# Patient Record
Sex: MALE | Race: WHITE | NOT HISPANIC OR LATINO | Employment: FULL TIME | URBAN - METROPOLITAN AREA
[De-identification: names, ages, dates, MRNs, and addresses within clinical notes are randomized per-mention and may not be internally consistent; named-entity substitution may affect disease eponyms.]

---

## 2023-09-01 LAB
CHOLESTEROL (MG/DL) IN SER/PLAS: 144 MG/DL (ref 0–199)
CHOLESTEROL IN HDL (MG/DL) IN SER/PLAS: 52.2 MG/DL
CHOLESTEROL/HDL RATIO: 2.8
FASTING GLUCOSE (MG/DL) IN SER/PLAS: 148 MG/DL (ref 74–99)
LDL: 72 MG/DL (ref 0–99)
THYROTROPIN (MIU/L) IN SER/PLAS BY DETECTION LIMIT <= 0.05 MIU/L: 1.8 MIU/L (ref 0.44–3.98)
TRIGLYCERIDE (MG/DL) IN SER/PLAS: 101 MG/DL (ref 0–149)
VLDL: 20 MG/DL (ref 0–40)

## 2023-09-02 LAB
CHOLESTEROL IN LDL (MG/DL) IN SER/PLAS BY DIRECT ASSAY: 75 MG/DL (ref 0–129)
THYROXINE (T4) (UG/DL) IN SER/PLAS: 7.5 UG/DL (ref 4.5–11.1)

## 2023-12-07 DIAGNOSIS — I25.10 CORONARY ARTERY DISEASE INVOLVING NATIVE CORONARY ARTERY OF NATIVE HEART, UNSPECIFIED WHETHER ANGINA PRESENT: ICD-10-CM

## 2023-12-07 DIAGNOSIS — N52.9 ERECTILE DYSFUNCTION, UNSPECIFIED ERECTILE DYSFUNCTION TYPE: Primary | ICD-10-CM

## 2023-12-07 NOTE — TELEPHONE ENCOUNTER
----- Message from Malini Palacio sent at 12/7/2023 11:24 AM EST -----  Regarding: Med Refill  Patient called for refill of sildenafil citrate 100mg as needed.  Please send to [facility] .

## 2023-12-08 RX ORDER — SILDENAFIL 100 MG/1
100 TABLET, FILM COATED ORAL AS NEEDED
COMMUNITY
Start: 2022-03-10 | End: 2023-12-08 | Stop reason: SDUPTHER

## 2023-12-08 RX ORDER — SILDENAFIL 100 MG/1
100 TABLET, FILM COATED ORAL AS NEEDED
Qty: 20 TABLET | Refills: 0 | Status: SHIPPED | OUTPATIENT
Start: 2023-12-08 | End: 2024-02-21 | Stop reason: SDUPTHER

## 2023-12-08 NOTE — TELEPHONE ENCOUNTER
Last appointment: 8/24/23 with Dr. Bolaños  Next appointment: Needs 6 month appointment scheduled. Message sent to Edyta  Hasn't seen APRN

## 2023-12-11 NOTE — TELEPHONE ENCOUNTER
Order placed and I notified patient. He would like to have towards end of January. Message sent to Edyta.

## 2023-12-11 NOTE — TELEPHONE ENCOUNTER
I called and spoke with patient and let him know medication refilled, but future refills will need to come from PCP or we could refer to urology, but he declined at this time.  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 He is wondering if stress test could be ordered prior to his upcoming appointment 2/9/24 with Dr. Bolaños? Which type of stress test if ordering. Thanks

## 2023-12-28 ENCOUNTER — APPOINTMENT (OUTPATIENT)
Dept: CARDIOLOGY | Facility: HOSPITAL | Age: 73
End: 2023-12-28
Payer: COMMERCIAL

## 2024-01-29 ENCOUNTER — TELEPHONE (OUTPATIENT)
Dept: CARDIOLOGY | Facility: CLINIC | Age: 74
End: 2024-01-29

## 2024-01-29 ENCOUNTER — HOSPITAL ENCOUNTER (OUTPATIENT)
Dept: CARDIOLOGY | Facility: HOSPITAL | Age: 74
Discharge: HOME | End: 2024-01-29
Payer: COMMERCIAL

## 2024-01-29 DIAGNOSIS — I25.10 CORONARY ARTERY DISEASE INVOLVING NATIVE CORONARY ARTERY OF NATIVE HEART, UNSPECIFIED WHETHER ANGINA PRESENT: ICD-10-CM

## 2024-01-29 PROCEDURE — 93018 CV STRESS TEST I&R ONLY: CPT | Performed by: INTERNAL MEDICINE

## 2024-01-29 PROCEDURE — 93016 CV STRESS TEST SUPVJ ONLY: CPT | Performed by: INTERNAL MEDICINE

## 2024-01-29 PROCEDURE — 93017 CV STRESS TEST TRACING ONLY: CPT

## 2024-01-29 NOTE — TELEPHONE ENCOUNTER
----- Message from Alber Bolaños MD sent at 1/29/2024  7:23 AM EST -----  Regarding: RE: Stress Results  It was already read by Dr. De La Rosa. Unfortunately he read it abnormal. He will need a repeat stress with echo as ECG may be false positive  ----- Message -----  From: Edyta Howell  Sent: 1/29/2024   7:00 AM EST  To: Alber Bolaños MD  Subject: Stress Results                                   Patient would like you to be the one to read his stress test results. He had it today at 8 am per [facility].

## 2024-01-30 NOTE — TELEPHONE ENCOUNTER
----- Message from Alber Bolaños MD sent at 1/30/2024 12:34 PM EST -----  Stress test looks ok on this one

## 2024-01-30 NOTE — TELEPHONE ENCOUNTER
"Just to clarify the 2 different messages. Please review. Does patient need \"repeat stress with echo as ECG may be false positive \" since Dr. De La Rosa read as abnormal?  "

## 2024-02-07 DIAGNOSIS — Z00.00 BLOOD TESTS FOR ROUTINE GENERAL PHYSICAL EXAMINATION: Primary | ICD-10-CM

## 2024-02-07 RX ORDER — METOPROLOL SUCCINATE 100 MG/1
100 TABLET, EXTENDED RELEASE ORAL DAILY
COMMUNITY
End: 2024-02-09 | Stop reason: SDUPTHER

## 2024-02-07 RX ORDER — EPINEPHRINE 0.22MG
100 AEROSOL WITH ADAPTER (ML) INHALATION
COMMUNITY
Start: 2020-03-30

## 2024-02-07 RX ORDER — TADALAFIL 5 MG/1
5 TABLET ORAL
COMMUNITY
Start: 2022-03-10 | End: 2024-02-09 | Stop reason: WASHOUT

## 2024-02-07 RX ORDER — DILTIAZEM HYDROCHLORIDE 180 MG/1
CAPSULE, EXTENDED RELEASE ORAL
COMMUNITY
End: 2024-02-09 | Stop reason: WASHOUT

## 2024-02-07 RX ORDER — FUROSEMIDE 20 MG/1
1 TABLET ORAL DAILY
COMMUNITY
End: 2024-02-09 | Stop reason: WASHOUT

## 2024-02-07 RX ORDER — CHOLECALCIFEROL (VITAMIN D3) 125 MCG
CAPSULE ORAL
COMMUNITY

## 2024-02-07 RX ORDER — LISINOPRIL 2.5 MG/1
1 TABLET ORAL DAILY
COMMUNITY
Start: 2018-07-18 | End: 2024-02-09 | Stop reason: SDUPTHER

## 2024-02-07 RX ORDER — FLUTICASONE PROPIONATE 50 MCG
SPRAY, SUSPENSION (ML) NASAL
COMMUNITY
Start: 2023-03-07

## 2024-02-07 RX ORDER — AMIODARONE HYDROCHLORIDE 200 MG/1
200 TABLET ORAL 2 TIMES DAILY
COMMUNITY
End: 2024-02-09 | Stop reason: WASHOUT

## 2024-02-07 RX ORDER — NITROGLYCERIN 0.4 MG/1
0.4 TABLET SUBLINGUAL
COMMUNITY

## 2024-02-07 RX ORDER — ATORVASTATIN CALCIUM 40 MG/1
1 TABLET, FILM COATED ORAL DAILY
COMMUNITY
Start: 2018-01-16 | End: 2024-02-09 | Stop reason: SDUPTHER

## 2024-02-07 RX ORDER — ASPIRIN 81 MG/1
1 TABLET ORAL DAILY
COMMUNITY
Start: 2019-06-19

## 2024-02-08 ENCOUNTER — LAB (OUTPATIENT)
Dept: LAB | Facility: LAB | Age: 74
End: 2024-02-08
Payer: MEDICARE

## 2024-02-08 DIAGNOSIS — Z00.00 BLOOD TESTS FOR ROUTINE GENERAL PHYSICAL EXAMINATION: ICD-10-CM

## 2024-02-08 LAB
CHOLEST SERPL-MCNC: 135 MG/DL (ref 0–199)
GLUCOSE P FAST SERPL-MCNC: 123 MG/DL (ref 74–99)
HDLC SERPL-MCNC: 51 MG/DL
LDLC SERPL DIRECT ASSAY-MCNC: 73 MG/DL (ref 0–129)
TRIGL SERPL-MCNC: 81 MG/DL (ref 0–149)
TSH SERPL-ACNC: 0.53 MIU/L (ref 0.44–3.98)

## 2024-02-08 PROCEDURE — 84443 ASSAY THYROID STIM HORMONE: CPT

## 2024-02-08 PROCEDURE — 36415 COLL VENOUS BLD VENIPUNCTURE: CPT

## 2024-02-08 PROCEDURE — 82947 ASSAY GLUCOSE BLOOD QUANT: CPT

## 2024-02-08 PROCEDURE — 80061 LIPID PANEL: CPT

## 2024-02-08 PROCEDURE — 83721 ASSAY OF BLOOD LIPOPROTEIN: CPT

## 2024-02-08 NOTE — PROGRESS NOTES
"Counseling:  The patient was counseled regarding diagnostic results, instructions for management, risk factor reductions, prognosis, patient and family education, impressions, risks and benefits of treatment options and importance of compliance with treatment.      Chief Complaint:   The patient presents today for 6-month followup of a-fib/flutter, CMP, and CAD.      History Of Present Illness:    Alfred Díaz is a 73 year old male patient who presents today for 6-month followup of a-fib/flutter, CMP, and CAD. His PMH is significant for CAD s/p PCI of RCA 08/22/2016 with repeat in 12/2021, HTN, BPH, hyperlipidemia, h/p PE, atrial flutter/a-fib with COVID pneumonia, CMP, and h/o non-Hodgkin's lymphoma 20 years ago (treated, in remission). Stress testing performed 01/29/2024 was negative for ischemia. Over the past 6 months, the patient states that he has done well from a cardiac standpoint. He denies any CP, chest discomfort or SOB. BP is stable. The patient is compliant with his prescribed medications.     Last Recorded Vitals:  Vitals:    02/09/24 1102 02/09/24 1116   BP: (!) 158/100 134/70   BP Location: Left arm    Pulse: 80    SpO2: 97%    Weight: 117 kg (259 lb)    Height: 1.88 m (6' 2\")        Past Surgical History:  He has a past surgical history that includes Tonsillectomy (06/06/2018); Adenoidectomy (06/06/2018); Lymph node biopsy (06/06/2018); Other surgical history (04/06/2020); Coronary angioplasty (06/07/2018); and Ankle surgery (06/07/2018).      Social History:  He reports that he has never smoked. He has never used smokeless tobacco. He reports that he does not currently use alcohol. He reports that he does not use drugs.    Family History:  No family history on file.     Allergies:  Patient has no known allergies.    Outpatient Medications:  Current Outpatient Medications   Medication Instructions    aspirin 81 mg EC tablet 1 tablet, oral, Daily    atorvastatin (Lipitor) 40 mg tablet 1 tablet, oral, " Daily    cholecalciferol (Vitamin D-3) 125 MCG (5000 UT) capsule oral    coenzyme Q-10 100 mg, oral    fluticasone (Flonase) 50 mcg/actuation nasal spray INSTILL 2 SPRAYS IN EACH NOSTRIL EVERY DAY 1 HOUR BEFORE BEDTIME    lisinopril 2.5 mg tablet 1 tablet, oral, Daily    metoprolol succinate XL (TOPROL-XL) 100 mg, oral, Daily    nitroglycerin (NITROSTAT) 0.4 mg, sublingual    sildenafil (VIAGRA) 100 mg, oral, As needed, DAILY 1 HOUR BEFORE NEEDED     Review of Systems   All other systems reviewed and are negative.     Physical Exam:  Constitutional:       Appearance: Healthy appearance. Not in distress.   Neck:      Vascular: No JVR. JVD normal.   Pulmonary:      Effort: Pulmonary effort is normal.      Breath sounds: Normal breath sounds. No wheezing. No rhonchi. No rales.   Chest:      Chest wall: Not tender to palpatation.   Cardiovascular:      PMI at left midclavicular line. Normal rate. Regular rhythm. Normal S1. Normal S2.       Murmurs: There is no murmur.      No gallop.  No click. No rub.   Pulses:     Intact distal pulses.   Edema:     Peripheral edema absent.   Abdominal:      General: Bowel sounds are normal.      Palpations: Abdomen is soft.      Tenderness: There is no abdominal tenderness.   Musculoskeletal: Normal range of motion.         General: No tenderness. Skin:     General: Skin is warm and dry.   Neurological:      General: No focal deficit present.      Mental Status: Alert and oriented to person, place and time.          Last Labs:  CBC -  Lab Results   Component Value Date    WBC 4.3 (L) 02/06/2023    HGB 14.2 02/06/2023    HCT 40.9 (L) 02/06/2023    MCV 86 02/06/2023     02/06/2023       CMP -  Lab Results   Component Value Date    CALCIUM 9.1 02/06/2023    PROT 6.5 04/27/2022    ALBUMIN 4.3 04/27/2022    AST 12 04/27/2022    ALT 11 04/27/2022    ALKPHOS 76 04/27/2022    BILITOT 0.9 04/27/2022       LIPID PANEL -   Lab Results   Component Value Date    CHOL 135 02/08/2024    TRIG  81 02/08/2024    HDL 51.0 02/08/2024    CHHDL 2.8 09/01/2023    LDLF 72 09/01/2023    VLDL 20 09/01/2023       RENAL FUNCTION PANEL -   Lab Results   Component Value Date    GLUCOSE 146 (H) 02/06/2023     02/06/2023    K 3.7 02/06/2023     02/06/2023    CO2 27 02/06/2023    ANIONGAP 11 02/06/2023    BUN 16 02/06/2023    CREATININE 0.78 02/06/2023    GFRMALE >90 02/06/2023    CALCIUM 9.1 02/06/2023    ALBUMIN 4.3 04/27/2022        Lab Results   Component Value Date     (H) 11/06/2021       Last Cardiology Tests:  01/29/2024 - Exercise Stress Test  1. Adequate level of stress achieved.  2. No clinical or electrocardiographic evidence for ischemia at maximal workload.  3. No ECG changes from baseline.    02/07/2023 - Cardiac Catheterization (LH)  1. Non obstructive CAD in a right dominant system. Previous patent stent in the RCA.  2. Left Ventricular end-diastolic pressure = 16 mmHg.     01/31/2023 - Exercise Stress Test  1. Abnormal Stress Test.  2. The adequate level of stress was achieved.  3. The [place] score is 4.  4. ECG changes consistent with ischemia.      04/12/2022 to 04/14/2022 - Holter Monitor  1. Primary rhythm was sinus rhythm; avg HR 64 bpm, min HR 44 bpm, max  bpm.  2. PSVC(s): [place] was 0.15%.  3. SVT (AT, RT): 7 events; longest event 8 beats, fastest event 138 bpm.  4. PVC(s): [place] was 0.17%.     03/03/2022 - TTE  1. The left ventricular systolic function is normal with a 60-65% estimated ejection fraction.  2. Spectral Doppler shows an impaired relaxation pattern of left ventricular diastolic filling.  3. Severely enlarged right ventricle.  4. The left atrium is moderately dilated.     03/03/2022 - CTA Chest for PE  1. Limited evaluation for pulmonary embolism. No definite large or obvious pulmonary embolism is seen. However a small pulmonary embolism not excluded given suboptimal contrast phase.  2. Tiny area of atelectasis or infiltrate in the right lung base. Consider  short-term follow-up to document resolution.  3. Coronary artery calcification.     01/21/2022 - Exercise Stress Test  1. Non-diagnostic due to abnormal baseline EKG. Hypertensive blood pressure response.  2. The adequate level of stress was achieved.     12/16/2021 - Cardiac Catheterization (LH)  1. Single vessel coronary artery disease without proximal left anterior descending involvement.  2. The right posterior descending artery showed severe atherosclerotic disease.  3. Culprit vessel(s): right posterior descending artery.  4. Successful PCI of PDA.     12/15/2021 - TTE  1. The left ventricular systolic function is mildly decreased with a 40-45% estimated ejection fraction.  2. There is global hypokinesis of the left ventricle with minor regional variations.     12/14/2021 - Electrophysiology Procedure  1. 5MG IV METOPROLOL TARTRATE GIVEN IVP PRIOR TO CARDIOVERSION FOR RATE CONTROL.  2. Successful direct current cardioversion for atrial fibrillation, resulting in normal sinus rhythm.     11/23/2021 - [facility]  1. The left ventricular systolic function is moderately decreased with a 30-35% estimated ejection fraction.  2. Successful direct current cardioversion for atrial fibrillation resulting in normal sinus rhythm.  3. The left atrium is severely dilated.  4. No left atrial thrombus.  5. There is global hypokinesis of the left ventricle with minor regional variations.     11/06/2021 - CXR  Airspace disease right lung base. Suspect early fluid overload.     11/05/2021 - CTA Chest for PE  1. Single subsegmental pulmonary embolism right lower lobe and single segmental pulmonary embolism left upper lobe extending into proximal segmental branches. No evidence of right heart strain or pulmonary infarction.  2. Severe pulmonary edema and small bilateral pleural effusions.  3. Peripancreatic stranding/edema, suboptimally assessed due to contrast timing and partial visualization and could be due to save anasarca the  recommend correlation with pancreatic enzymes.     11/05/2021 - CXR  1. Bilateral symmetric interstitial opacities representing interlobular septal thickening with perihilar congestion suggestive of interstitial edema.  2. Ill-defined bibasilar airspace opacities may relate to alveolar edema or pneumonia given known positive COVID-19 status.     11/05/2021 - U/S Duplex Lower Extremity Veins, Bilateral  1. No evidence for DVT within the right lower extremity.  2. Two right Shelby's cysts.     11/05/2021 - TTE  1. The left ventricular systolic function is moderately decreased with a 35-40% estimated ejection fraction.  2. There is global hypokinesis of the left ventricle with minor regional variations.     07/30/2020 - Exercise Stress Test  1. Probably normal graded exercise stress test.  2. Interpretation limited by motion artifact.  3. If clinically indicated, would consider stress testing via alternate modality, such as Lexiscan or dobutamine stress testing.  4. No clinical evidence for ischemia at maximal workload.  5. The adequate level of stress was achieved.     07/05/2018 - Echocardiogram   1. Normal left ventricular size and regional systolic function with an ejection fraction of 55%.  2. Moderate concentric left ventricular hypertrophy.  3. Normal right ventricular systolic function.      08/22/2016 - Cardiac Catheterization   1. LVEDP elevated at 24 mmHg.  2. Left Ventricle: The estimated ejection fraction is 40%. Hypokinesis of the diaphragmatic myocardium.  3. Right dominant coronary system.   4. Right Coronary: Mid-vessel lesion. There is a 99% stenosis. The lesion was stented. Following intervention, the lesion has a residual stenosis of 0%, an excellent angiographic appearance, and NARCISA grade 3 flow (brisk flow).  5. Left to right collaterals.    Lab review: I have personally reviewed the laboratory result(s).   Diagnostic review: I have personally reviewed the result(s) of the Exercise Stress  Echo.    Assessment/Plan   1) Atrial Flutter/Fib with COVID pneumonia  metoprolol succinate 100 mg daily   s/p Cardioversion  In NSR  Holter for 24 hrs - In NSR  Off anticoagulation  I suspect a-fib was precipitated by COVID     2) Cardiomyopathy  On ASA 81 mg daily, lisinopril 2.5 mg daily, metoprolol succinate 100 mg daily   Possibly rate related due to a-fib  EF normalized  Stable and asymptomatic   F/U 6 months     3) Hx of PE  On anticoagulation  Repeat CT chest with resolution of thrombus     4) CAD s/p PCI of PDA  On ASA 81 mg daily, lisinopril 2.5 mg daily, metoprolol succinate 100 mg daily, atorvastatin 40 mg daily  Most recent cath with patent stent and no new CAD  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

## 2024-02-09 ENCOUNTER — OFFICE VISIT (OUTPATIENT)
Dept: CARDIOLOGY | Facility: CLINIC | Age: 74
End: 2024-02-09
Payer: COMMERCIAL

## 2024-02-09 VITALS
WEIGHT: 259 LBS | OXYGEN SATURATION: 97 % | DIASTOLIC BLOOD PRESSURE: 70 MMHG | BODY MASS INDEX: 33.24 KG/M2 | HEIGHT: 74 IN | SYSTOLIC BLOOD PRESSURE: 134 MMHG | HEART RATE: 80 BPM

## 2024-02-09 DIAGNOSIS — I25.10 CORONARY ARTERY DISEASE INVOLVING NATIVE CORONARY ARTERY OF NATIVE HEART, UNSPECIFIED WHETHER ANGINA PRESENT: Primary | ICD-10-CM

## 2024-02-09 PROCEDURE — 99213 OFFICE O/P EST LOW 20 MIN: CPT | Performed by: INTERNAL MEDICINE

## 2024-02-09 PROCEDURE — 1159F MED LIST DOCD IN RCRD: CPT | Performed by: INTERNAL MEDICINE

## 2024-02-09 PROCEDURE — 3008F BODY MASS INDEX DOCD: CPT | Performed by: INTERNAL MEDICINE

## 2024-02-09 PROCEDURE — 1160F RVW MEDS BY RX/DR IN RCRD: CPT | Performed by: INTERNAL MEDICINE

## 2024-02-09 PROCEDURE — 1036F TOBACCO NON-USER: CPT | Performed by: INTERNAL MEDICINE

## 2024-02-09 RX ORDER — LISINOPRIL 2.5 MG/1
2.5 TABLET ORAL DAILY
Qty: 90 TABLET | Refills: 3 | Status: SHIPPED | OUTPATIENT
Start: 2024-02-09 | End: 2025-02-08

## 2024-02-09 RX ORDER — METOPROLOL SUCCINATE 100 MG/1
100 TABLET, EXTENDED RELEASE ORAL DAILY
Qty: 90 TABLET | Refills: 3 | Status: SHIPPED | OUTPATIENT
Start: 2024-02-09 | End: 2025-02-08

## 2024-02-09 RX ORDER — ATORVASTATIN CALCIUM 40 MG/1
40 TABLET, FILM COATED ORAL DAILY
Qty: 90 TABLET | Refills: 3 | Status: SHIPPED | OUTPATIENT
Start: 2024-02-09 | End: 2025-02-08

## 2024-02-09 ASSESSMENT — ENCOUNTER SYMPTOMS
OCCASIONAL FEELINGS OF UNSTEADINESS: 0
LOSS OF SENSATION IN FEET: 0
DEPRESSION: 0

## 2024-02-09 NOTE — Clinical Note
"February 9, 2024       No Recipients    Patient: Alfred Díaz   YOB: 1950   Date of Visit: 2/9/2024       Dear Dr. Pendleton Recipients:    Thank you for referring Alfred Díaz to me for evaluation. Below are my notes for this consultation.  If you have questions, please do not hesitate to call me. I look forward to following your patient along with you.       Sincerely,     Alber Bolaños MD      CC:   No Recipients  ______________________________________________________________________________________    Counseling:  The patient was counseled regarding diagnostic results, instructions for management, risk factor reductions, prognosis, patient and family education, impressions, risks and benefits of treatment options and importance of compliance with treatment.      Chief Complaint:   The patient presents today for 6-month followup of a-fib/flutter, CMP, and CAD.      History Of Present Illness:    Alfred Díaz is a 73 year old male patient who presents today for 6-month followup of a-fib/flutter, CMP, and CAD. His PMH is significant for CAD s/p PCI of RCA 08/22/2016 with repeat in 12/2021, HTN, BPH, hyperlipidemia, h/p PE, atrial flutter/a-fib with COVID pneumonia, CMP, and h/o non-Hodgkin's lymphoma 20 years ago (treated, in remission). Stress testing performed 01/29/2024 was negative for ischemia. Over the past 6 months, the patient states that he has done well from a cardiac standpoint. He denies any CP, chest discomfort or SOB.      Last Recorded Vitals:  Vitals:    02/09/24 1102 02/09/24 1116   BP: (!) 158/100 134/70   BP Location: Left arm    Pulse: 80    SpO2: 97%    Weight: 117 kg (259 lb)    Height: 1.88 m (6' 2\")        Past Surgical History:  He has a past surgical history that includes Tonsillectomy (06/06/2018); Adenoidectomy (06/06/2018); Lymph node biopsy (06/06/2018); Other surgical history (04/06/2020); Coronary angioplasty (06/07/2018); and Ankle surgery (06/07/2018).      Social " History:  He reports that he has never smoked. He has never used smokeless tobacco. He reports that he does not currently use alcohol. He reports that he does not use drugs.    Family History:  No family history on file.     Allergies:  Patient has no known allergies.    Outpatient Medications:  Current Outpatient Medications   Medication Instructions    aspirin 81 mg EC tablet 1 tablet, oral, Daily    atorvastatin (Lipitor) 40 mg tablet 1 tablet, oral, Daily    cholecalciferol (Vitamin D-3) 125 MCG (5000 UT) capsule oral    coenzyme Q-10 100 mg, oral    fluticasone (Flonase) 50 mcg/actuation nasal spray INSTILL 2 SPRAYS IN EACH NOSTRIL EVERY DAY 1 HOUR BEFORE BEDTIME    lisinopril 2.5 mg tablet 1 tablet, oral, Daily    metoprolol succinate XL (TOPROL-XL) 100 mg, oral, Daily    nitroglycerin (NITROSTAT) 0.4 mg, sublingual    sildenafil (VIAGRA) 100 mg, oral, As needed, DAILY 1 HOUR BEFORE NEEDED     Review of Systems   All other systems reviewed and are negative.     Physical Exam:  Constitutional:       Appearance: Healthy appearance. Not in distress.   Neck:      Vascular: No JVR. JVD normal.   Pulmonary:      Effort: Pulmonary effort is normal.      Breath sounds: Normal breath sounds. No wheezing. No rhonchi. No rales.   Chest:      Chest wall: Not tender to palpatation.   Cardiovascular:      PMI at left midclavicular line. Normal rate. Regular rhythm. Normal S1. Normal S2.       Murmurs: There is no murmur.      No gallop.  No click. No rub.   Pulses:     Intact distal pulses.   Edema:     Peripheral edema absent.   Abdominal:      General: Bowel sounds are normal.      Palpations: Abdomen is soft.      Tenderness: There is no abdominal tenderness.   Musculoskeletal: Normal range of motion.         General: No tenderness. Skin:     General: Skin is warm and dry.   Neurological:      General: No focal deficit present.      Mental Status: Alert and oriented to person, place and time.          Last Labs:  CBC  -  Lab Results   Component Value Date    WBC 4.3 (L) 02/06/2023    HGB 14.2 02/06/2023    HCT 40.9 (L) 02/06/2023    MCV 86 02/06/2023     02/06/2023       CMP -  Lab Results   Component Value Date    CALCIUM 9.1 02/06/2023    PROT 6.5 04/27/2022    ALBUMIN 4.3 04/27/2022    AST 12 04/27/2022    ALT 11 04/27/2022    ALKPHOS 76 04/27/2022    BILITOT 0.9 04/27/2022       LIPID PANEL -   Lab Results   Component Value Date    CHOL 135 02/08/2024    TRIG 81 02/08/2024    HDL 51.0 02/08/2024    CHHDL 2.8 09/01/2023    LDLF 72 09/01/2023    VLDL 20 09/01/2023       RENAL FUNCTION PANEL -   Lab Results   Component Value Date    GLUCOSE 146 (H) 02/06/2023     02/06/2023    K 3.7 02/06/2023     02/06/2023    CO2 27 02/06/2023    ANIONGAP 11 02/06/2023    BUN 16 02/06/2023    CREATININE 0.78 02/06/2023    GFRMALE >90 02/06/2023    CALCIUM 9.1 02/06/2023    ALBUMIN 4.3 04/27/2022        Lab Results   Component Value Date     (H) 11/06/2021       Last Cardiology Tests:  01/29/2024 - Exercise Stress Test  1. Adequate level of stress achieved.  2. No clinical or electrocardiographic evidence for ischemia at maximal workload.  3. No ECG changes from baseline.    02/07/2023 - Cardiac Catheterization (LH)  1. Non obstructive CAD in a right dominant system. Previous patent stent in the RCA.  2. Left Ventricular end-diastolic pressure = 16 mmHg.     01/31/2023 - Exercise Stress Test  1. Abnormal Stress Test.  2. The adequate level of stress was achieved.  3. The [place] score is 4.  4. ECG changes consistent with ischemia.      04/12/2022 to 04/14/2022 - Holter Monitor  1. Primary rhythm was sinus rhythm; avg HR 64 bpm, min HR 44 bpm, max  bpm.  2. PSVC(s): [place] was 0.15%.  3. SVT (AT, RT): 7 events; longest event 8 beats, fastest event 138 bpm.  4. PVC(s): [place] was 0.17%.     03/03/2022 - TTE  1. The left ventricular systolic function is normal with a 60-65% estimated ejection fraction.  2. Spectral  Doppler shows an impaired relaxation pattern of left ventricular diastolic filling.  3. Severely enlarged right ventricle.  4. The left atrium is moderately dilated.     03/03/2022 - CTA Chest for PE  1. Limited evaluation for pulmonary embolism. No definite large or obvious pulmonary embolism is seen. However a small pulmonary embolism not excluded given suboptimal contrast phase.  2. Tiny area of atelectasis or infiltrate in the right lung base. Consider short-term follow-up to document resolution.  3. Coronary artery calcification.     01/21/2022 - Exercise Stress Test  1. Non-diagnostic due to abnormal baseline EKG. Hypertensive blood pressure response.  2. The adequate level of stress was achieved.     12/16/2021 - Cardiac Catheterization (LH)  1. Single vessel coronary artery disease without proximal left anterior descending involvement.  2. The right posterior descending artery showed severe atherosclerotic disease.  3. Culprit vessel(s): right posterior descending artery.  4. Successful PCI of PDA.     12/15/2021 - TTE  1. The left ventricular systolic function is mildly decreased with a 40-45% estimated ejection fraction.  2. There is global hypokinesis of the left ventricle with minor regional variations.     12/14/2021 - Electrophysiology Procedure  1. 5MG IV METOPROLOL TARTRATE GIVEN IVP PRIOR TO CARDIOVERSION FOR RATE CONTROL.  2. Successful direct current cardioversion for atrial fibrillation, resulting in normal sinus rhythm.     11/23/2021 - S[facility]NC[facility]  1. The left ventricular systolic function is moderately decreased with a 30-35% estimated ejection fraction.  2. Successful direct current cardioversion for atrial fibrillation resulting in normal sinus rhythm.  3. The left atrium is severely dilated.  4. No left atrial thrombus.  5. There is global hypokinesis of the left ventricle with minor regional variations.     11/06/2021 - CXR  Airspace disease right lung base. Suspect early fluid overload.      11/05/2021 - CTA Chest for PE  1. Single subsegmental pulmonary embolism right lower lobe and single segmental pulmonary embolism left upper lobe extending into proximal segmental branches. No evidence of right heart strain or pulmonary infarction.  2. Severe pulmonary edema and small bilateral pleural effusions.  3. Peripancreatic stranding/edema, suboptimally assessed due to contrast timing and partial visualization and could be due to save anasarca the recommend correlation with pancreatic enzymes.     11/05/2021 - CXR  1. Bilateral symmetric interstitial opacities representing interlobular septal thickening with perihilar congestion suggestive of interstitial edema.  2. Ill-defined bibasilar airspace opacities may relate to alveolar edema or pneumonia given known positive COVID-19 status.     11/05/2021 - U/S Duplex Lower Extremity Veins, Bilateral  1. No evidence for DVT within the right lower extremity.  2. Two right Shelby's cysts.     11/05/2021 - TTE  1. The left ventricular systolic function is moderately decreased with a 35-40% estimated ejection fraction.  2. There is global hypokinesis of the left ventricle with minor regional variations.     07/30/2020 - Exercise Stress Test  1. Probably normal graded exercise stress test.  2. Interpretation limited by motion artifact.  3. If clinically indicated, would consider stress testing via alternate modality, such as Lexiscan or dobutamine stress testing.  4. No clinical evidence for ischemia at maximal workload.  5. The adequate level of stress was achieved.     07/05/2018 - Echocardiogram   1. Normal left ventricular size and regional systolic function with an ejection fraction of 55%.  2. Moderate concentric left ventricular hypertrophy.  3. Normal right ventricular systolic function.      08/22/2016 - Cardiac Catheterization   1. LVEDP elevated at 24 mmHg.  2. Left Ventricle: The estimated ejection fraction is 40%. Hypokinesis of the diaphragmatic  myocardium.  3. Right dominant coronary system.   4. Right Coronary: Mid-vessel lesion. There is a 99% stenosis. The lesion was stented. Following intervention, the lesion has a residual stenosis of 0%, an excellent angiographic appearance, and NARCISA grade 3 flow (brisk flow).  5. Left to right collaterals.    Lab review: I have personally reviewed the laboratory result(s).   Diagnostic review: I have personally reviewed the result(s) of the Exercise Stress Echo.    Assessment/Plan  1) Atrial Flutter/Fib with COVID pneumonia  metoprolol succinate 100 mg daily   s/p Cardioversion  In NSR  Holter for 24 hrs - In NSR  Off anticoagulation  I suspect a-fib was precipitated by COVID     2) Cardiomyopathy  On ASA 81 mg daily, lisinopril 2.5 mg daily, metoprolol succinate 100 mg daily   Possibly rate related due to a-fib  EF normalized  Stable and asymptomatic   F/U 6 months     3) Hx of PE  On anticoagulation  Repeat CT chest with resolution of thrombus     4) CAD s/p PCI of PDA  On ASA 81 mg daily, lisinopril 2.5 mg daily, metoprolol succinate 100 mg daily, atorvastatin 40 mg daily  Most recent cath with patent stent and no new CAD  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

## 2024-02-09 NOTE — PATIENT INSTRUCTIONS
Continue all current medications as prescribed.   For your elevated fasting blood sugar, watch carbohydrate intake (rice, potatoes, pasta, bread, ice-cream all within moderation).  Followup with Dr. Bolaños in 6 months.    If you have any questions or cardiac concerns, please call our office at [phone].

## 2024-02-20 DIAGNOSIS — N52.9 ERECTILE DYSFUNCTION, UNSPECIFIED ERECTILE DYSFUNCTION TYPE: ICD-10-CM

## 2024-02-20 NOTE — TELEPHONE ENCOUNTER
----- Message from Malini Kerr sent at 2/20/2024  3:05 PM EST -----  Regarding: Medication missing  Patient called saying he is to have a a year supply of sildenafil. Please send  to [facility].

## 2024-02-21 NOTE — TELEPHONE ENCOUNTER
Last Appointment: 2/9/24 with Dr. Bolaños  Next Appointment: 8/9/24 with Dr. Bolaños  Last Refilled: 12/8/23 20 tabs 0 refills

## 2024-02-22 RX ORDER — SILDENAFIL 100 MG/1
100 TABLET, FILM COATED ORAL AS NEEDED
Qty: 20 TABLET | Refills: 6 | Status: SHIPPED | OUTPATIENT
Start: 2024-02-22 | End: 2024-03-13

## 2024-03-19 ENCOUNTER — TELEPHONE (OUTPATIENT)
Dept: CARDIOLOGY | Facility: CLINIC | Age: 74
End: 2024-03-19
Payer: COMMERCIAL

## 2024-03-19 NOTE — TELEPHONE ENCOUNTER
I called and spoke with Dr. Guzman from [employer], [employer], and he reports he received forms on this patient but needs Dr Bolaños's signature. Also needs Chadsvasc score and back of form needs addressed. He will fax paperwork to the office.

## 2024-03-27 NOTE — TELEPHONE ENCOUNTER
Never received forms. I tried to call Dr Guzman again but phone won't ring. I will have Cole try and call and request and watch for the fax.

## 2024-03-28 NOTE — TELEPHONE ENCOUNTER
----- Message from Berto Lan sent at 3/28/2024  2:51 PM EDT -----  Regarding: Patient called  Patient called in and said you are filling out papers with him he has some info that will help just said to give him a call

## 2024-03-28 NOTE — TELEPHONE ENCOUNTER
Patient stopped back in office with form that needs filled out and letter needed for corrections to the diagnosis' that were listed as active which are actually just a history. Will need to create letter for patient.

## 2024-03-28 NOTE — TELEPHONE ENCOUNTER
Patient stopped in the office and reports that forms need filled out and provided a list of active problems that also need corrected per patient bc he says none of the active problems are active and are history.   I called and left a voicemail for Dr. Guzman that I need forms faxed to the office. I have received no forms for patient. I have not filled out any forms at end of 2023 or 2024. I let patient know I need information as to what [facility] is requesting/forms.

## 2024-03-28 NOTE — TELEPHONE ENCOUNTER
I called and spoke with patient and he wanted to discuss his [id number] score which I will need to calculate when forms started.

## 2024-04-05 NOTE — TELEPHONE ENCOUNTER
Letter, form, most current office note, and stress test emailed to patient as requested. I left a voicemail for patient letting him know this is being sent to him as requested. I asked that if he would like me to fax this anywhere I am happy to do so and to just call back and let me know.

## 2024-05-07 PROBLEM — E66.09 NON MORBID OBESITY DUE TO EXCESS CALORIES: Status: ACTIVE | Noted: 2017-09-22

## 2024-05-07 PROBLEM — K59.00 CONSTIPATION: Status: ACTIVE | Noted: 2024-05-07

## 2024-05-07 PROBLEM — R04.0 ANTERIOR EPISTAXIS: Status: ACTIVE | Noted: 2021-03-10

## 2024-05-07 PROBLEM — R79.82 ELEVATED C-REACTIVE PROTEIN (CRP): Status: ACTIVE | Noted: 2024-05-07

## 2024-05-07 PROBLEM — I10 BENIGN ESSENTIAL HYPERTENSION: Status: ACTIVE | Noted: 2024-05-07

## 2024-05-07 PROBLEM — M25.561 RIGHT KNEE PAIN: Status: ACTIVE | Noted: 2024-05-07

## 2024-05-07 PROBLEM — I48.91 ATRIAL FIBRILLATION (MULTI): Status: ACTIVE | Noted: 2024-05-07

## 2024-05-07 PROBLEM — M54.9 BACK PAIN: Status: ACTIVE | Noted: 2024-05-07

## 2024-05-07 PROBLEM — K64.5 THROMBOSED EXTERNAL HEMORRHOID: Status: ACTIVE | Noted: 2024-05-07

## 2024-05-07 PROBLEM — R82.89 ABNORMAL URINE CYTOLOGY: Status: ACTIVE | Noted: 2024-05-07

## 2024-05-07 PROBLEM — N28.1 CYST OF LEFT KIDNEY: Status: ACTIVE | Noted: 2024-05-07

## 2024-05-07 PROBLEM — M25.562 LEFT KNEE PAIN: Status: ACTIVE | Noted: 2024-05-07

## 2024-05-07 PROBLEM — Z95.5 STENTED CORONARY ARTERY: Status: ACTIVE | Noted: 2017-09-22

## 2024-05-07 PROBLEM — R31.29 OTHER MICROSCOPIC HEMATURIA: Status: ACTIVE | Noted: 2024-05-07

## 2024-05-07 PROBLEM — J31.0 CHRONIC RHINITIS: Status: ACTIVE | Noted: 2021-03-10

## 2024-05-07 PROBLEM — E78.5 HYPERLIPIDEMIA: Status: ACTIVE | Noted: 2024-05-07

## 2024-05-07 PROBLEM — N52.9 ERECTILE DYSFUNCTION: Status: ACTIVE | Noted: 2024-05-07

## 2024-05-07 PROBLEM — N40.0 BPH (BENIGN PROSTATIC HYPERPLASIA): Status: ACTIVE | Noted: 2024-05-07

## 2024-05-07 PROBLEM — I26.99 PULMONARY EMBOLISM (MULTI): Status: ACTIVE | Noted: 2024-05-07

## 2024-05-07 PROBLEM — S83.249A MEDIAL MENISCUS TEAR: Status: ACTIVE | Noted: 2024-05-07

## 2024-05-07 PROBLEM — D68.9 BLOOD COAGULATION DISORDER (MULTI): Status: ACTIVE | Noted: 2021-03-10

## 2024-05-07 PROBLEM — Z95.5 PRESENCE OF CORONARY ANGIOPLASTY IMPLANT AND GRAFT: Status: ACTIVE | Noted: 2024-05-07

## 2024-05-07 PROBLEM — I42.9 CARDIOMYOPATHY (MULTI): Status: ACTIVE | Noted: 2024-05-07

## 2024-05-07 PROBLEM — Z98.61 S/P PTCA (PERCUTANEOUS TRANSLUMINAL CORONARY ANGIOPLASTY): Status: ACTIVE | Noted: 2024-05-07

## 2024-06-07 DIAGNOSIS — I48.91 ATRIAL FIBRILLATION, UNSPECIFIED TYPE (MULTI): Primary | ICD-10-CM

## 2024-06-11 ENCOUNTER — CLINICAL SUPPORT (OUTPATIENT)
Dept: CARDIOLOGY | Facility: CLINIC | Age: 74
End: 2024-06-11
Payer: MEDICARE

## 2024-06-11 DIAGNOSIS — I48.91 ATRIAL FIBRILLATION, UNSPECIFIED TYPE (MULTI): ICD-10-CM

## 2024-06-11 PROCEDURE — 93246 EXT ECG>7D<15D RECORDING: CPT | Performed by: INTERNAL MEDICINE

## 2024-06-17 ENCOUNTER — ANCILLARY PROCEDURE (OUTPATIENT)
Dept: CARDIOLOGY | Facility: CLINIC | Age: 74
End: 2024-06-17
Payer: MEDICARE

## 2024-06-17 DIAGNOSIS — I48.91 ATRIAL FIBRILLATION, UNSPECIFIED TYPE (MULTI): ICD-10-CM

## 2024-06-17 PROCEDURE — 93227 XTRNL ECG REC<48 HR R&I: CPT | Performed by: STUDENT IN AN ORGANIZED HEALTH CARE EDUCATION/TRAINING PROGRAM

## 2024-06-17 PROCEDURE — 93225 XTRNL ECG REC<48 HRS REC: CPT | Performed by: STUDENT IN AN ORGANIZED HEALTH CARE EDUCATION/TRAINING PROGRAM

## 2024-08-07 NOTE — PROGRESS NOTES
"Primary Cardiologist: Dr. Bolaños    Chief Complaint:   The patient presents today for 6-month followup of a-fib/flutter, CMP, and CAD.      History Of Present Illness:    Alfred Díaz is a 73 year old male patient with past medical history of CAD s/p PCI of RCA 08/22/2016 with repeat in 12/2021, HTN, BPH, hyperlipidemia, h/p PE, atrial flutter/a-fib with COVID pneumonia, CMP, and h/o non-Hodgkin's lymphoma 20 years ago (treated, in remission). Stress testing performed 01/29/2024 was negative for ischemia. Patient presents today for 6 month follow up.     Today, patient denies any cardiac concerns, no recent hospitalizations. He denies chest pain or pressure, no shortness of breath, no dizziness or lightheadedness, no lower extremity edema, no palpitations.      Last Recorded Vitals:  Vitals:    08/09/24 0837   BP: 132/88   BP Location: Left arm   Pulse: 63   Weight: 120 kg (264 lb)   Height: 1.88 m (6' 2\")         Past Surgical History:  He has a past surgical history that includes Tonsillectomy (06/06/2018); Adenoidectomy (06/06/2018); Lymph node biopsy (06/06/2018); Other surgical history (04/06/2020); Coronary angioplasty (06/07/2018); and Ankle surgery (06/07/2018).      Social History:  He reports that he has never smoked. He has never used smokeless tobacco. He reports that he does not currently use alcohol. He reports that he does not use drugs.    Family History:  No family history on file.     Allergies:  Patient has no known allergies.    Outpatient Medications:  Current Outpatient Medications   Medication Instructions    aspirin 81 mg EC tablet 1 tablet, oral, Daily    atorvastatin (LIPITOR) 40 mg, oral, Daily    lisinopril 2.5 mg, oral, Daily    metoprolol succinate XL (TOPROL-XL) 100 mg, oral, Daily    sildenafil (VIAGRA) 100 mg, oral, As needed, DAILY 1 HOUR BEFORE NEEDED     Review of Systems   Constitutional: Negative for malaise/fatigue.   HENT: Negative.     Eyes: Negative.    Cardiovascular:  " Negative for chest pain, dyspnea on exertion, leg swelling and palpitations.   Respiratory:  Negative for shortness of breath.    Endocrine: Negative.    Hematologic/Lymphatic: Negative.    Skin: Negative.    Musculoskeletal: Negative.    Gastrointestinal: Negative.    Genitourinary: Negative.    Neurological:  Negative for dizziness and light-headedness.   Psychiatric/Behavioral: Negative.     All other systems reviewed and are negative.     Physical Exam:  Constitutional:       Appearance: Healthy appearance. Not in distress.   Neck:      Vascular: No JVR. JVD normal.   Pulmonary:      Effort: Pulmonary effort is normal.      Breath sounds: Normal breath sounds. No wheezing. No rhonchi. No rales.   Chest:      Chest wall: Not tender to palpatation.   Cardiovascular:      PMI at left midclavicular line. Normal rate. Regular rhythm. Normal S1. Normal S2.       Murmurs: There is no murmur.      No gallop.  No click. No rub.   Pulses:     Intact distal pulses.   Edema:     Peripheral edema absent.   Abdominal:      General: Bowel sounds are normal.      Palpations: Abdomen is soft.      Tenderness: There is no abdominal tenderness.   Musculoskeletal: Normal range of motion.         General: No tenderness. Skin:     General: Skin is warm and dry.   Neurological:      General: No focal deficit present.      Mental Status: Alert and oriented to person, place and time.          Last Labs:  CBC -  Lab Results   Component Value Date    WBC 4.3 (L) 02/06/2023    HGB 14.2 02/06/2023    HCT 40.9 (L) 02/06/2023    MCV 86 02/06/2023     02/06/2023       CMP -  Lab Results   Component Value Date    CALCIUM 9.1 02/06/2023    PROT 6.5 04/27/2022    ALBUMIN 4.3 04/27/2022    AST 12 04/27/2022    ALT 11 04/27/2022    ALKPHOS 76 04/27/2022    BILITOT 0.9 04/27/2022       LIPID PANEL -   Lab Results   Component Value Date    CHOL 135 02/08/2024    TRIG 81 02/08/2024    HDL 51.0 02/08/2024    CHHDL 2.8 09/01/2023    LDLF 72  09/01/2023    VLDL 20 09/01/2023       RENAL FUNCTION PANEL -   Lab Results   Component Value Date    GLUCOSE 146 (H) 02/06/2023     02/06/2023    K 3.7 02/06/2023     02/06/2023    CO2 27 02/06/2023    ANIONGAP 11 02/06/2023    BUN 16 02/06/2023    CREATININE 0.78 02/06/2023    GFRMALE >90 02/06/2023    CALCIUM 9.1 02/06/2023    ALBUMIN 4.3 04/27/2022        Lab Results   Component Value Date     (H) 11/06/2021       Last Cardiology Tests:  Holter monitor 6/11/24:: Patient wore a monitor for 1 day and 1 hour starting at 6/11/2024 and ending at 6/12/2024: Patient had a minimum heart rate of 40 bpm, maximum heart rate of 95 bpm, and average heart rate of 61 bpm.  Predominant underlying rhythm was sinus rhythm.  First-degree AV block was present.    01/29/2024 - Exercise Stress Test  1. Adequate level of stress achieved.  2. No clinical or electrocardiographic evidence for ischemia at maximal workload.  3. No ECG changes from baseline.    02/07/2023 - Cardiac Catheterization (LH)  1. Non obstructive CAD in a right dominant system. Previous patent stent in the RCA.  2. Left Ventricular end-diastolic pressure = 16 mmHg.     01/31/2023 - Exercise Stress Test  1. Abnormal Stress Test.  2. The adequate level of stress was achieved.  3. The [place] score is 4.  4. ECG changes consistent with ischemia.      04/12/2022 to 04/14/2022 - Holter Monitor  1. Primary rhythm was sinus rhythm; avg HR 64 bpm, min HR 44 bpm, max  bpm.  2. PSVC(s): [place] was 0.15%.  3. SVT (AT, RT): 7 events; longest event 8 beats, fastest event 138 bpm.  4. PVC(s): [place] was 0.17%.     03/03/2022 - TTE  1. The left ventricular systolic function is normal with a 60-65% estimated ejection fraction.  2. Spectral Doppler shows an impaired relaxation pattern of left ventricular diastolic filling.  3. Severely enlarged right ventricle.  4. The left atrium is moderately dilated.     03/03/2022 - CTA Chest for PE  1. Limited evaluation  for pulmonary embolism. No definite large or obvious pulmonary embolism is seen. However a small pulmonary embolism not excluded given suboptimal contrast phase.  2. Tiny area of atelectasis or infiltrate in the right lung base. Consider short-term follow-up to document resolution.  3. Coronary artery calcification.     01/21/2022 - Exercise Stress Test  1. Non-diagnostic due to abnormal baseline EKG. Hypertensive blood pressure response.  2. The adequate level of stress was achieved.     12/16/2021 - Cardiac Catheterization (LH)  1. Single vessel coronary artery disease without proximal left anterior descending involvement.  2. The right posterior descending artery showed severe atherosclerotic disease.  3. Culprit vessel(s): right posterior descending artery.  4. Successful PCI of PDA.     12/15/2021 - TTE  1. The left ventricular systolic function is mildly decreased with a 40-45% estimated ejection fraction.  2. There is global hypokinesis of the left ventricle with minor regional variations.     12/14/2021 - Electrophysiology Procedure  1. 5MG IV METOPROLOL TARTRATE GIVEN IVP PRIOR TO CARDIOVERSION FOR RATE CONTROL.  2. Successful direct current cardioversion for atrial fibrillation, resulting in normal sinus rhythm.     11/23/2021 - [facility]  1. The left ventricular systolic function is moderately decreased with a 30-35% estimated ejection fraction.  2. Successful direct current cardioversion for atrial fibrillation resulting in normal sinus rhythm.  3. The left atrium is severely dilated.  4. No left atrial thrombus.  5. There is global hypokinesis of the left ventricle with minor regional variations.     11/06/2021 - CXR  Airspace disease right lung base. Suspect early fluid overload.     11/05/2021 - CTA Chest for PE  1. Single subsegmental pulmonary embolism right lower lobe and single segmental pulmonary embolism left upper lobe extending into proximal segmental branches. No evidence of right heart strain or  pulmonary infarction.  2. Severe pulmonary edema and small bilateral pleural effusions.  3. Peripancreatic stranding/edema, suboptimally assessed due to contrast timing and partial visualization and could be due to save anasarca the recommend correlation with pancreatic enzymes.     11/05/2021 - CXR  1. Bilateral symmetric interstitial opacities representing interlobular septal thickening with perihilar congestion suggestive of interstitial edema.  2. Ill-defined bibasilar airspace opacities may relate to alveolar edema or pneumonia given known positive COVID-19 status.     11/05/2021 - U/S Duplex Lower Extremity Veins, Bilateral  1. No evidence for DVT within the right lower extremity.  2. Two right Shelby's cysts.     11/05/2021 - TTE  1. The left ventricular systolic function is moderately decreased with a 35-40% estimated ejection fraction.  2. There is global hypokinesis of the left ventricle with minor regional variations.     07/30/2020 - Exercise Stress Test  1. Probably normal graded exercise stress test.  2. Interpretation limited by motion artifact.  3. If clinically indicated, would consider stress testing via alternate modality, such as Lexiscan or dobutamine stress testing.  4. No clinical evidence for ischemia at maximal workload.  5. The adequate level of stress was achieved.     07/05/2018 - Echocardiogram   1. Normal left ventricular size and regional systolic function with an ejection fraction of 55%.  2. Moderate concentric left ventricular hypertrophy.  3. Normal right ventricular systolic function.      08/22/2016 - Cardiac Catheterization   1. LVEDP elevated at 24 mmHg.  2. Left Ventricle: The estimated ejection fraction is 40%. Hypokinesis of the diaphragmatic myocardium.  3. Right dominant coronary system.   4. Right Coronary: Mid-vessel lesion. There is a 99% stenosis. The lesion was stented. Following intervention, the lesion has a residual stenosis of 0%, an excellent angiographic  appearance, and NARCISA grade 3 flow (brisk flow).  5. Left to right collaterals.    Lab review: I have personally reviewed the laboratory result(s).   Diagnostic review: I have personally reviewed the result(s) of the Exercise Stress Echo.    Assessment/Plan   Alfred Díaz is a 73 year old male patient with past medical history of CAD s/p PCI of RCA 08/22/2016 with repeat in 12/2021, HTN, BPH, hyperlipidemia, h/p PE, atrial flutter/a-fib with COVID pneumonia, CMP, and h/o non-Hodgkin's lymphoma 20 years ago (treated, in remission). Stress testing performed 01/29/2024 was negative for ischemia. Patient presents today for 6 month follow up.       1) Atrial Flutter/Fib   In setting of COVID immunization-per patient.   s/p Cardioversion  ECG today demonstrates SR with 1st degree AVB, rate of 63 bpm.  03/03/2022 - TTE: The left ventricular systolic function is normal with a 60-65% estimated ejection fraction.  Today, denies any palpitations  Holter for 24 hrs 6/11/24- In NSR  Off anticoagulation    2) Cardiomyopathy  In setting of Afib with now normal LVEF  Today, appears compensated and euvolemic on exam.  Continue on ASA 81 mg daily, lisinopril 2.5 mg daily, metoprolol succinate 100 mg daily      3) Hx of PE  Repeat CT chest with resolution of thrombus  Denies shortness of breath today.    4) CAD s/p PCI of PDA  C 2/8/23: Non obstructive CAD in a right dominant system. Previous patent stent in the RCA.   01/29/2024 - Exercise Stress Test: No clinical or electrocardiographic evidence for ischemia at maximal workload.  TTE with normal LVEF on most recent echo  Today, denies chest pain or pressure.   LDL 73 (2/8/24)->reports he had repeat labs done in June 2024 with LDL under 70  Blood pressure is well controlled today  Continue on Aspirin 81 mg daily, lisinopril 2.5 mg daily, metoprolol succinate 100 mg daily, atorvastatin 40 mg daily

## 2024-08-08 ENCOUNTER — TELEPHONE (OUTPATIENT)
Dept: CARDIOLOGY | Facility: HOSPITAL | Age: 74
End: 2024-08-08
Payer: COMMERCIAL

## 2024-08-09 ENCOUNTER — APPOINTMENT (OUTPATIENT)
Dept: CARDIOLOGY | Facility: CLINIC | Age: 74
End: 2024-08-09
Payer: MEDICARE

## 2024-08-09 ENCOUNTER — OFFICE VISIT (OUTPATIENT)
Dept: CARDIOLOGY | Facility: HOSPITAL | Age: 74
End: 2024-08-09
Payer: MEDICARE

## 2024-08-09 VITALS
HEIGHT: 74 IN | WEIGHT: 264 LBS | HEART RATE: 63 BPM | DIASTOLIC BLOOD PRESSURE: 88 MMHG | BODY MASS INDEX: 33.88 KG/M2 | SYSTOLIC BLOOD PRESSURE: 132 MMHG

## 2024-08-09 DIAGNOSIS — E78.2 MIXED HYPERLIPIDEMIA: ICD-10-CM

## 2024-08-09 DIAGNOSIS — I48.0 PAROXYSMAL ATRIAL FIBRILLATION (MULTI): ICD-10-CM

## 2024-08-09 DIAGNOSIS — I25.10 CORONARY ARTERY DISEASE INVOLVING NATIVE CORONARY ARTERY OF NATIVE HEART WITHOUT ANGINA PECTORIS: Primary | ICD-10-CM

## 2024-08-09 DIAGNOSIS — Z98.61 S/P PTCA (PERCUTANEOUS TRANSLUMINAL CORONARY ANGIOPLASTY): ICD-10-CM

## 2024-08-09 DIAGNOSIS — I10 BENIGN ESSENTIAL HYPERTENSION: ICD-10-CM

## 2024-08-09 DIAGNOSIS — I25.10 CORONARY ARTERY DISEASE INVOLVING NATIVE CORONARY ARTERY OF NATIVE HEART, UNSPECIFIED WHETHER ANGINA PRESENT: ICD-10-CM

## 2024-08-09 PROCEDURE — 1036F TOBACCO NON-USER: CPT | Performed by: CLINICAL NURSE SPECIALIST

## 2024-08-09 PROCEDURE — 99213 OFFICE O/P EST LOW 20 MIN: CPT | Performed by: CLINICAL NURSE SPECIALIST

## 2024-08-09 PROCEDURE — 1159F MED LIST DOCD IN RCRD: CPT | Performed by: CLINICAL NURSE SPECIALIST

## 2024-08-09 PROCEDURE — 3079F DIAST BP 80-89 MM HG: CPT | Performed by: CLINICAL NURSE SPECIALIST

## 2024-08-09 PROCEDURE — 1160F RVW MEDS BY RX/DR IN RCRD: CPT | Performed by: CLINICAL NURSE SPECIALIST

## 2024-08-09 PROCEDURE — 3075F SYST BP GE 130 - 139MM HG: CPT | Performed by: CLINICAL NURSE SPECIALIST

## 2024-08-09 PROCEDURE — 3008F BODY MASS INDEX DOCD: CPT | Performed by: CLINICAL NURSE SPECIALIST

## 2024-08-09 ASSESSMENT — ENCOUNTER SYMPTOMS
DEPRESSION: 0
SHORTNESS OF BREATH: 0
PSYCHIATRIC NEGATIVE: 1
LIGHT-HEADEDNESS: 0
PALPITATIONS: 0
MUSCULOSKELETAL NEGATIVE: 1
EYES NEGATIVE: 1
LOSS OF SENSATION IN FEET: 0
OCCASIONAL FEELINGS OF UNSTEADINESS: 0
DIZZINESS: 0
DYSPNEA ON EXERTION: 0
GASTROINTESTINAL NEGATIVE: 1
ENDOCRINE NEGATIVE: 1
HEMATOLOGIC/LYMPHATIC NEGATIVE: 1

## 2024-08-09 NOTE — PATIENT INSTRUCTIONS
Call our office with any new cardiac concerns  Continue current medications  Continue heart-healthy diet. A diet low in sodium, low in cholesterol, limiting red meats and eating whole foods.   Follow up with Dr. Bolaños in 6 months

## 2024-08-27 DIAGNOSIS — N52.9 ERECTILE DYSFUNCTION, UNSPECIFIED ERECTILE DYSFUNCTION TYPE: ICD-10-CM

## 2024-08-27 DIAGNOSIS — I25.10 CORONARY ARTERY DISEASE INVOLVING NATIVE CORONARY ARTERY OF NATIVE HEART, UNSPECIFIED WHETHER ANGINA PRESENT: ICD-10-CM

## 2024-08-27 DIAGNOSIS — I10 BENIGN ESSENTIAL HYPERTENSION: Primary | ICD-10-CM

## 2024-08-27 DIAGNOSIS — E78.2 MIXED HYPERLIPIDEMIA: ICD-10-CM

## 2024-08-28 RX ORDER — LISINOPRIL 2.5 MG/1
2.5 TABLET ORAL DAILY
Qty: 90 TABLET | Refills: 1 | Status: SHIPPED | OUTPATIENT
Start: 2024-08-28 | End: 2025-08-28

## 2024-08-28 RX ORDER — SILDENAFIL 100 MG/1
100 TABLET, FILM COATED ORAL AS NEEDED
Qty: 20 TABLET | Refills: 6 | Status: SHIPPED | OUTPATIENT
Start: 2024-08-28 | End: 2024-09-17

## 2024-08-28 RX ORDER — ASPIRIN 81 MG/1
81 TABLET ORAL DAILY
Qty: 90 TABLET | Refills: 3 | Status: SHIPPED | OUTPATIENT
Start: 2024-08-28

## 2024-08-28 RX ORDER — METOPROLOL SUCCINATE 100 MG/1
100 TABLET, EXTENDED RELEASE ORAL DAILY
Qty: 90 TABLET | Refills: 3 | Status: SHIPPED | OUTPATIENT
Start: 2024-08-28 | End: 2025-08-28

## 2024-08-28 RX ORDER — ATORVASTATIN CALCIUM 40 MG/1
40 TABLET, FILM COATED ORAL DAILY
Qty: 90 TABLET | Refills: 2 | Status: SHIPPED | OUTPATIENT
Start: 2024-08-28 | End: 2025-08-28

## 2024-08-29 ENCOUNTER — TELEPHONE (OUTPATIENT)
Dept: CARDIOLOGY | Facility: HOSPITAL | Age: 74
End: 2024-08-29
Payer: COMMERCIAL

## 2024-08-29 NOTE — TELEPHONE ENCOUNTER
Pt called as well as  Office called to receive any and all reports on the PE they had in 2021. They also requested to talk to Becka JUDGE about said situation to see if this is resolved and/or what needs to be done to get resolved.    Kacey MCKEON

## 2025-02-27 ENCOUNTER — APPOINTMENT (OUTPATIENT)
Dept: CARDIOLOGY | Facility: HOSPITAL | Age: 75
End: 2025-02-27
Payer: COMMERCIAL

## 2025-03-21 DIAGNOSIS — I25.10 CORONARY ARTERY DISEASE INVOLVING NATIVE CORONARY ARTERY OF NATIVE HEART, UNSPECIFIED WHETHER ANGINA PRESENT: Primary | ICD-10-CM

## 2025-03-21 NOTE — PROGRESS NOTES
Pt called in requesting a stress test for clearance for flying. Per Dr. Bolaños to have pt complete an echo stress test. Order was placed at this time and notified to staff.

## 2025-03-27 DIAGNOSIS — R73.9 HYPERGLYCEMIA: Primary | ICD-10-CM

## 2025-03-28 ENCOUNTER — APPOINTMENT (OUTPATIENT)
Dept: CARDIOLOGY | Facility: HOSPITAL | Age: 75
End: 2025-03-28
Payer: MEDICARE

## 2025-03-28 LAB
ALBUMIN SERPL-MCNC: 4.7 G/DL (ref 3.6–5.1)
ALP SERPL-CCNC: 86 U/L (ref 35–144)
ALT SERPL-CCNC: 16 U/L (ref 9–46)
ANION GAP SERPL CALCULATED.4IONS-SCNC: 8 MMOL/L (CALC) (ref 7–17)
AST SERPL-CCNC: 15 U/L (ref 10–35)
BILIRUB SERPL-MCNC: 1.2 MG/DL (ref 0.2–1.2)
BUN SERPL-MCNC: 16 MG/DL (ref 7–25)
CALCIUM SERPL-MCNC: 9.8 MG/DL (ref 8.6–10.3)
CHLORIDE SERPL-SCNC: 102 MMOL/L (ref 98–110)
CO2 SERPL-SCNC: 29 MMOL/L (ref 20–32)
CREAT SERPL-MCNC: 0.84 MG/DL (ref 0.7–1.28)
EGFRCR SERPLBLD CKD-EPI 2021: 92 ML/MIN/1.73M2
EST. AVERAGE GLUCOSE BLD GHB EST-MCNC: 148 MG/DL
EST. AVERAGE GLUCOSE BLD GHB EST-SCNC: 8.2 MMOL/L
GLUCOSE SERPL-MCNC: 103 MG/DL (ref 65–99)
HBA1C MFR BLD: 6.8 % OF TOTAL HGB
POTASSIUM SERPL-SCNC: 4 MMOL/L (ref 3.5–5.3)
PROT SERPL-MCNC: 7 G/DL (ref 6.1–8.1)
SODIUM SERPL-SCNC: 139 MMOL/L (ref 135–146)

## 2025-04-07 ENCOUNTER — HOSPITAL ENCOUNTER (OUTPATIENT)
Dept: CARDIOLOGY | Facility: HOSPITAL | Age: 75
Discharge: HOME | End: 2025-04-07
Payer: MEDICARE

## 2025-04-07 ENCOUNTER — TELEPHONE (OUTPATIENT)
Dept: CARDIOLOGY | Facility: HOSPITAL | Age: 75
End: 2025-04-07

## 2025-04-07 DIAGNOSIS — I25.10 CORONARY ARTERY DISEASE INVOLVING NATIVE CORONARY ARTERY OF NATIVE HEART, UNSPECIFIED WHETHER ANGINA PRESENT: ICD-10-CM

## 2025-04-07 NOTE — TELEPHONE ENCOUNTER
Mr. Díaz came into the office to talk with someone about his cancelled Stress Echo test today and what our staff would like for him to do since it was canceled.  He also has an appointment with Dr Bolaños on Thursday that was supposed to go over the results of the now canceled test.   I spoke with Adrián on what advice we should give Mr. Díaz.  Adrián called the Cardiology department to get the reasons for the canceled test and he found out that Mr Díaz's BP was dangerously high.  Adrián recommended that Mr. Díaz should be going to the ER to have them check him out and that he can still keep his Dr. Bolaños appointment.  I informed Mr. Díaz that we are keeping his appointment with Dr. Bolaños and strongly recommended that he get checked out in the ER so if they found anything, that information would be available for Dr. Bolaños to see at his appointment.  Mr. Díaz left and did not indicate if he was going to the ER or not.

## 2025-04-07 NOTE — TELEPHONE ENCOUNTER
Alfred called in wanted to reschedule stress test, Was told per staff instructions to go to ED to evaluate and treat for high blood pressures, we were not able to reschedule until blood pressures were regulated. Alfred did verbalize understanding, feels it was due to a recent UTI, and was worried that scheduling this test takes weeks. Reassured that we would try to get him scheduled sooner rather than later once blood pressures were under control.

## 2025-04-07 NOTE — NURSING NOTE
Patient here for stress echo. IV started. Baseline manual blood pressure 220/108. Patient states he did not take any of his regular scheduled medications today. Instructed patient to go home and take his regular home medications and doses as prescribed by the doctor.  Then he can come back today at 1:00 and try to do the stress test again. IV removed and patient went home.

## 2025-04-09 NOTE — H&P (VIEW-ONLY)
"Counseling:  The patient was counseled regarding diagnostic results, instructions for management, risk factor reductions, prognosis, patient and family education, impressions, risks and benefits of treatment options and importance of compliance with treatment.      Chief Complaint:   The patient presents today for 8-month followup of a-fib/flutter, CMP, and CAD.      History Of Present Illness:    Alfred Díaz is a 75 year old male patient who presents today for 8-month followup of a-fib/flutter, CMP, and CAD. His PMH is significant for CAD s/p PCI of RCA 08/22/2016 with repeat in 12/2021, HTN, BPH, hyperlipidemia, h/p PE, atrial flutter/a-fib with COVID pneumonia, CMP, and h/o non-Hodgkin's lymphoma 20 years ago (treated, in remission). Over the past 8 months, the patient states that he has done well from a cardiac standpoint. He denies any CP, chest discomfort, SOB or palpitations. 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 He believes his BP was elevated as he recently had a UTI and was on steroids. BP is elevated today. The patient is compliant with his prescribed medications.     Last Recorded Vitals:  Vitals:    04/10/25 1240 04/10/25 1309   BP: (!) 150/100 160/90   BP Location: Left arm    Patient Position: Sitting    BP Cuff Size: Adult    Pulse: 65    SpO2: 97%    Weight: 117 kg (257 lb)    Height: 1.88 m (6' 2\")        Past Surgical History:  He has a past surgical history that includes Tonsillectomy (06/06/2018); Adenoidectomy (06/06/2018); Lymph node biopsy (06/06/2018); Other surgical history (04/06/2020); Coronary angioplasty (06/07/2018); and Ankle surgery (06/07/2018).      Social History:  He reports that he has never smoked. He has never used smokeless tobacco. He reports that he does not currently use alcohol. He reports that he does not use drugs.    Family History:  No family history on file.   "   Allergies:  Patient has no known allergies.    Outpatient Medications:  Current Outpatient Medications   Medication Instructions    aspirin 81 mg, oral, Daily    atorvastatin (LIPITOR) 40 mg, oral, Daily    lisinopril 10 mg, oral, Daily    metoprolol succinate XL (TOPROL-XL) 100 mg, oral, Daily    sildenafil (VIAGRA) 100 mg, oral, As needed, DAILY 1 HOUR BEFORE NEEDED     Review of Systems   All other systems reviewed and are negative.     Physical Exam:  Constitutional:       Appearance: Healthy appearance. Not in distress.   Neck:      Vascular: No JVR. JVD normal.   Pulmonary:      Effort: Pulmonary effort is normal.      Breath sounds: Normal breath sounds. No wheezing. No rhonchi. No rales.   Chest:      Chest wall: Not tender to palpatation.   Cardiovascular:      PMI at left midclavicular line. Normal rate. Regular rhythm. Normal S1. Normal S2.       Murmurs: There is no murmur.      No gallop.  No click. No rub.   Pulses:     Intact distal pulses.   Edema:     Peripheral edema absent.   Abdominal:      General: Bowel sounds are normal.      Palpations: Abdomen is soft.      Tenderness: There is no abdominal tenderness.   Musculoskeletal: Normal range of motion.         General: No tenderness. Skin:     General: Skin is warm and dry.   Neurological:      General: No focal deficit present.      Mental Status: Alert and oriented to person, place and time.          Last Labs:  CBC -  Lab Results   Component Value Date    WBC 4.3 (L) 02/06/2023    HGB 14.2 02/06/2023    HCT 40.9 (L) 02/06/2023    MCV 86 02/06/2023     02/06/2023       CMP -  Lab Results   Component Value Date    CALCIUM 9.8 03/27/2025    PROT 7.0 03/27/2025    ALBUMIN 4.7 03/27/2025    AST 15 03/27/2025    ALT 16 03/27/2025    ALKPHOS 86 03/27/2025    BILITOT 1.2 03/27/2025       LIPID PANEL -   Lab Results   Component Value Date    CHOL 135 02/08/2024    TRIG 81 02/08/2024    HDL 51.0 02/08/2024    CHHDL 2.8 09/01/2023    LDLF 72  09/01/2023    VLDL 20 09/01/2023       RENAL FUNCTION PANEL -   Lab Results   Component Value Date    GLUCOSE 103 (H) 03/27/2025     03/27/2025    K 4.0 03/27/2025     03/27/2025    CO2 29 03/27/2025    ANIONGAP 8 03/27/2025    BUN 16 03/27/2025    CREATININE 0.84 03/27/2025    GFRMALE >90 02/06/2023    CALCIUM 9.8 03/27/2025    ALBUMIN 4.7 03/27/2025        Lab Results   Component Value Date     (H) 11/06/2021    HGBA1C 6.8 (H) 03/27/2025       Last Cardiology Tests:  01/29/2024 - Exercise Stress Test  1. Adequate level of stress achieved.  2. No clinical or electrocardiographic evidence for ischemia at maximal workload.  3. No ECG changes from baseline.    02/07/2023 - Cardiac Catheterization (LH)  1. Non obstructive CAD in a right dominant system. Previous patent stent in the RCA.  2. Left Ventricular end-diastolic pressure = 16 mmHg.     01/31/2023 - Exercise Stress Test  1. Abnormal Stress Test.  2. The adequate level of stress was achieved.  3. The Licona score is 4.  4. ECG changes consistent with ischemia.      04/12/2022 to 04/14/2022 - Holter Monitor  1. Primary rhythm was sinus rhythm; avg HR 64 bpm, min HR 44 bpm, max  bpm.  2. PSVC(s): [place] was 0.15%.  3. SVT (AT, RT): 7 events; longest event 8 beats, fastest event 138 bpm.  4. PVC(s): [place] was 0.17%.     03/03/2022 - TTE  1. The left ventricular systolic function is normal with a 60-65% estimated ejection fraction.  2. Spectral Doppler shows an impaired relaxation pattern of left ventricular diastolic filling.  3. Severely enlarged right ventricle.  4. The left atrium is moderately dilated.     03/03/2022 - CTA Chest for PE  1. Limited evaluation for pulmonary embolism. No definite large or obvious pulmonary embolism is seen. However a small pulmonary embolism not excluded given suboptimal contrast phase.  2. Tiny area of atelectasis or infiltrate in the right lung base. Consider short-term follow-up to document  resolution.  3. Coronary artery calcification.     01/21/2022 - Exercise Stress Test  1. Non-diagnostic due to abnormal baseline EKG. Hypertensive blood pressure response.  2. The adequate level of stress was achieved.     12/16/2021 - Cardiac Catheterization (LH)  1. Single vessel coronary artery disease without proximal left anterior descending involvement.  2. The right posterior descending artery showed severe atherosclerotic disease.  3. Culprit vessel(s): right posterior descending artery.  4. Successful PCI of PDA.     12/15/2021 - TTE  1. The left ventricular systolic function is mildly decreased with a 40-45% estimated ejection fraction.  2. There is global hypokinesis of the left ventricle with minor regional variations.     12/14/2021 - Electrophysiology Procedure  1. 5MG IV METOPROLOL TARTRATE GIVEN IVP PRIOR TO CARDIOVERSION FOR RATE CONTROL.  2. Successful direct current cardioversion for atrial fibrillation, resulting in normal sinus rhythm.     11/23/2021 - SÁNCHEZ  1. The left ventricular systolic function is moderately decreased with a 30-35% estimated ejection fraction.  2. Successful direct current cardioversion for atrial fibrillation resulting in normal sinus rhythm.  3. The left atrium is severely dilated.  4. No left atrial thrombus.  5. There is global hypokinesis of the left ventricle with minor regional variations.     11/06/2021 - CXR  Airspace disease right lung base. Suspect early fluid overload.     11/05/2021 - CTA Chest for PE  1. Single subsegmental pulmonary embolism right lower lobe and single segmental pulmonary embolism left upper lobe extending into proximal segmental branches. No evidence of right heart strain or pulmonary infarction.  2. Severe pulmonary edema and small bilateral pleural effusions.  3. Peripancreatic stranding/edema, suboptimally assessed due to contrast timing and partial visualization and could be due to save anasarca the recommend correlation with pancreatic  enzymes.     11/05/2021 - CXR  1. Bilateral symmetric interstitial opacities representing interlobular septal thickening with perihilar congestion suggestive of interstitial edema.  2. Ill-defined bibasilar airspace opacities may relate to alveolar edema or pneumonia given known positive COVID-19 status.     11/05/2021 - U/S Duplex Lower Extremity Veins, Bilateral  1. No evidence for DVT within the right lower extremity.  2. Two right Shelby's cysts.     11/05/2021 - TTE  1. The left ventricular systolic function is moderately decreased with a 35-40% estimated ejection fraction.  2. There is global hypokinesis of the left ventricle with minor regional variations.     07/30/2020 - Exercise Stress Test  1. Probably normal graded exercise stress test.  2. Interpretation limited by motion artifact.  3. If clinically indicated, would consider stress testing via alternate modality, such as Lexiscan or dobutamine stress testing.  4. No clinical evidence for ischemia at maximal workload.  5. The adequate level of stress was achieved.     07/05/2018 - Echocardiogram   1. Normal left ventricular size and regional systolic function with an ejection fraction of 55%.  2. Moderate concentric left ventricular hypertrophy.  3. Normal right ventricular systolic function.      08/22/2016 - Cardiac Catheterization   1. LVEDP elevated at 24 mmHg.  2. Left Ventricle: The estimated ejection fraction is 40%. Hypokinesis of the diaphragmatic myocardium.  3. Right dominant coronary system.   4. Right Coronary: Mid-vessel lesion. There is a 99% stenosis. The lesion was stented. Following intervention, the lesion has a residual stenosis of 0%, an excellent angiographic appearance, and NARCISA grade 3 flow (brisk flow).  5. Left to right collaterals.    Lab review: I have personally reviewed the laboratory result(s).     Assessment/Plan   1) Atrial Flutter/Fib with COVID Pneumonia s/p Cardioversion  On ASA 81 mg daily, metoprolol succinate 100 mg  daily   Holter for 24 hrs - In NSR  Off anticoagulation  I suspect a-fib was precipitated by COVID  Denies palpitations  In NSR  Continue current medical Rx   F/U 6 months     2) Cardiomyopathy  On ASA 81 mg daily, lisinopril 2.5 mg daily, metoprolol succinate 100 mg daily   Possibly rate related due to a-fib  EF normalized  Denies CP, chest discomfort or SOB  BP elevated   Increase lisinopril to 10 mg daily   Continue all other medications as prescribed   F/U 6 months     3) Hx of PE  Repeat CT chest with resolution of thrombus     4) CAD s/p PCI of PDA  On ASA 81 mg daily, lisinopril 2.5 mg daily, metoprolol succinate 100 mg daily, atorvastatin 40 mg daily  Most recent cath with patent stent and no new CAD  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  BP elevated today    Increase lisinopril to 10 mg daily   Will reschedule stress echo - advised to hold metoprolol morning of test  Continue all other medications as prescribed   Check Lipid Panel and A1c  F/U 6 months      Scribe Attestation  By signing my name below, IKindra Scribe   attest that this documentation has been prepared under the direction and in the presence of Alber Bolaños MD.

## 2025-04-09 NOTE — PROGRESS NOTES
"Counseling:  The patient was counseled regarding diagnostic results, instructions for management, risk factor reductions, prognosis, patient and family education, impressions, risks and benefits of treatment options and importance of compliance with treatment.      Chief Complaint:   The patient presents today for 8-month followup of a-fib/flutter, CMP, and CAD.      History Of Present Illness:    Alfred Díaz is a 75 year old male patient who presents today for 8-month followup of a-fib/flutter, CMP, and CAD. His PMH is significant for CAD s/p PCI of RCA 08/22/2016 with repeat in 12/2021, HTN, BPH, hyperlipidemia, h/p PE, atrial flutter/a-fib with COVID pneumonia, CMP, and h/o non-Hodgkin's lymphoma 20 years ago (treated, in remission). Over the past 8 months, the patient states that he has done well from a cardiac standpoint. He denies any CP, chest discomfort, SOB or palpitations. 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 He believes his BP was elevated as he recently had a UTI and was on steroids. BP is elevated today. The patient is compliant with his prescribed medications.     Last Recorded Vitals:  Vitals:    04/10/25 1240 04/10/25 1309   BP: (!) 150/100 160/90   BP Location: Left arm    Patient Position: Sitting    BP Cuff Size: Adult    Pulse: 65    SpO2: 97%    Weight: 117 kg (257 lb)    Height: 1.88 m (6' 2\")        Past Surgical History:  He has a past surgical history that includes Tonsillectomy (06/06/2018); Adenoidectomy (06/06/2018); Lymph node biopsy (06/06/2018); Other surgical history (04/06/2020); Coronary angioplasty (06/07/2018); and Ankle surgery (06/07/2018).      Social History:  He reports that he has never smoked. He has never used smokeless tobacco. He reports that he does not currently use alcohol. He reports that he does not use drugs.    Family History:  No family history on file.   "   Allergies:  Patient has no known allergies.    Outpatient Medications:  Current Outpatient Medications   Medication Instructions    aspirin 81 mg, oral, Daily    atorvastatin (LIPITOR) 40 mg, oral, Daily    lisinopril 10 mg, oral, Daily    metoprolol succinate XL (TOPROL-XL) 100 mg, oral, Daily    sildenafil (VIAGRA) 100 mg, oral, As needed, DAILY 1 HOUR BEFORE NEEDED     Review of Systems   All other systems reviewed and are negative.     Physical Exam:  Constitutional:       Appearance: Healthy appearance. Not in distress.   Neck:      Vascular: No JVR. JVD normal.   Pulmonary:      Effort: Pulmonary effort is normal.      Breath sounds: Normal breath sounds. No wheezing. No rhonchi. No rales.   Chest:      Chest wall: Not tender to palpatation.   Cardiovascular:      PMI at left midclavicular line. Normal rate. Regular rhythm. Normal S1. Normal S2.       Murmurs: There is no murmur.      No gallop.  No click. No rub.   Pulses:     Intact distal pulses.   Edema:     Peripheral edema absent.   Abdominal:      General: Bowel sounds are normal.      Palpations: Abdomen is soft.      Tenderness: There is no abdominal tenderness.   Musculoskeletal: Normal range of motion.         General: No tenderness. Skin:     General: Skin is warm and dry.   Neurological:      General: No focal deficit present.      Mental Status: Alert and oriented to person, place and time.          Last Labs:  CBC -  Lab Results   Component Value Date    WBC 4.3 (L) 02/06/2023    HGB 14.2 02/06/2023    HCT 40.9 (L) 02/06/2023    MCV 86 02/06/2023     02/06/2023       CMP -  Lab Results   Component Value Date    CALCIUM 9.8 03/27/2025    PROT 7.0 03/27/2025    ALBUMIN 4.7 03/27/2025    AST 15 03/27/2025    ALT 16 03/27/2025    ALKPHOS 86 03/27/2025    BILITOT 1.2 03/27/2025       LIPID PANEL -   Lab Results   Component Value Date    CHOL 135 02/08/2024    TRIG 81 02/08/2024    HDL 51.0 02/08/2024    CHHDL 2.8 09/01/2023    LDLF 72  09/01/2023    VLDL 20 09/01/2023       RENAL FUNCTION PANEL -   Lab Results   Component Value Date    GLUCOSE 103 (H) 03/27/2025     03/27/2025    K 4.0 03/27/2025     03/27/2025    CO2 29 03/27/2025    ANIONGAP 8 03/27/2025    BUN 16 03/27/2025    CREATININE 0.84 03/27/2025    GFRMALE >90 02/06/2023    CALCIUM 9.8 03/27/2025    ALBUMIN 4.7 03/27/2025        Lab Results   Component Value Date     (H) 11/06/2021    HGBA1C 6.8 (H) 03/27/2025       Last Cardiology Tests:  01/29/2024 - Exercise Stress Test  1. Adequate level of stress achieved.  2. No clinical or electrocardiographic evidence for ischemia at maximal workload.  3. No ECG changes from baseline.    02/07/2023 - Cardiac Catheterization (LH)  1. Non obstructive CAD in a right dominant system. Previous patent stent in the RCA.  2. Left Ventricular end-diastolic pressure = 16 mmHg.     01/31/2023 - Exercise Stress Test  1. Abnormal Stress Test.  2. The adequate level of stress was achieved.  3. The Licona score is 4.  4. ECG changes consistent with ischemia.      04/12/2022 to 04/14/2022 - Holter Monitor  1. Primary rhythm was sinus rhythm; avg HR 64 bpm, min HR 44 bpm, max  bpm.  2. PSVC(s): [place] was 0.15%.  3. SVT (AT, RT): 7 events; longest event 8 beats, fastest event 138 bpm.  4. PVC(s): [place] was 0.17%.     03/03/2022 - TTE  1. The left ventricular systolic function is normal with a 60-65% estimated ejection fraction.  2. Spectral Doppler shows an impaired relaxation pattern of left ventricular diastolic filling.  3. Severely enlarged right ventricle.  4. The left atrium is moderately dilated.     03/03/2022 - CTA Chest for PE  1. Limited evaluation for pulmonary embolism. No definite large or obvious pulmonary embolism is seen. However a small pulmonary embolism not excluded given suboptimal contrast phase.  2. Tiny area of atelectasis or infiltrate in the right lung base. Consider short-term follow-up to document  resolution.  3. Coronary artery calcification.     01/21/2022 - Exercise Stress Test  1. Non-diagnostic due to abnormal baseline EKG. Hypertensive blood pressure response.  2. The adequate level of stress was achieved.     12/16/2021 - Cardiac Catheterization (LH)  1. Single vessel coronary artery disease without proximal left anterior descending involvement.  2. The right posterior descending artery showed severe atherosclerotic disease.  3. Culprit vessel(s): right posterior descending artery.  4. Successful PCI of PDA.     12/15/2021 - TTE  1. The left ventricular systolic function is mildly decreased with a 40-45% estimated ejection fraction.  2. There is global hypokinesis of the left ventricle with minor regional variations.     12/14/2021 - Electrophysiology Procedure  1. 5MG IV METOPROLOL TARTRATE GIVEN IVP PRIOR TO CARDIOVERSION FOR RATE CONTROL.  2. Successful direct current cardioversion for atrial fibrillation, resulting in normal sinus rhythm.     11/23/2021 - [facility]NC[facility]  1. The left ventricular systolic function is moderately decreased with a 30-35% estimated ejection fraction.  2. Successful direct current cardioversion for atrial fibrillation resulting in normal sinus rhythm.  3. The left atrium is severely dilated.  4. No left atrial thrombus.  5. There is global hypokinesis of the left ventricle with minor regional variations.     11/06/2021 - CXR  Airspace disease right lung base. Suspect early fluid overload.     11/05/2021 - CTA Chest for PE  1. Single subsegmental pulmonary embolism right lower lobe and single segmental pulmonary embolism left upper lobe extending into proximal segmental branches. No evidence of right heart strain or pulmonary infarction.  2. Severe pulmonary edema and small bilateral pleural effusions.  3. Peripancreatic stranding/edema, suboptimally assessed due to contrast timing and partial visualization and could be due to save anasarca the recommend correlation with pancreatic  enzymes.     11/05/2021 - CXR  1. Bilateral symmetric interstitial opacities representing interlobular septal thickening with perihilar congestion suggestive of interstitial edema.  2. Ill-defined bibasilar airspace opacities may relate to alveolar edema or pneumonia given known positive COVID-19 status.     11/05/2021 - U/S Duplex Lower Extremity Veins, Bilateral  1. No evidence for DVT within the right lower extremity.  2. Two right Shelby's cysts.     11/05/2021 - TTE  1. The left ventricular systolic function is moderately decreased with a 35-40% estimated ejection fraction.  2. There is global hypokinesis of the left ventricle with minor regional variations.     07/30/2020 - Exercise Stress Test  1. Probably normal graded exercise stress test.  2. Interpretation limited by motion artifact.  3. If clinically indicated, would consider stress testing via alternate modality, such as Lexiscan or dobutamine stress testing.  4. No clinical evidence for ischemia at maximal workload.  5. The adequate level of stress was achieved.     07/05/2018 - Echocardiogram   1. Normal left ventricular size and regional systolic function with an ejection fraction of 55%.  2. Moderate concentric left ventricular hypertrophy.  3. Normal right ventricular systolic function.      08/22/2016 - Cardiac Catheterization   1. LVEDP elevated at 24 mmHg.  2. Left Ventricle: The estimated ejection fraction is 40%. Hypokinesis of the diaphragmatic myocardium.  3. Right dominant coronary system.   4. Right Coronary: Mid-vessel lesion. There is a 99% stenosis. The lesion was stented. Following intervention, the lesion has a residual stenosis of 0%, an excellent angiographic appearance, and NARCISA grade 3 flow (brisk flow).  5. Left to right collaterals.    Lab review: I have personally reviewed the laboratory result(s).     Assessment/Plan   1) Atrial Flutter/Fib with COVID Pneumonia s/p Cardioversion  On ASA 81 mg daily, metoprolol succinate 100 mg  daily   Holter for 24 hrs - In NSR  Off anticoagulation  I suspect a-fib was precipitated by COVID  Denies palpitations  In NSR  Continue current medical Rx   F/U 6 months     2) Cardiomyopathy  On ASA 81 mg daily, lisinopril 2.5 mg daily, metoprolol succinate 100 mg daily   Possibly rate related due to a-fib  EF normalized  Denies CP, chest discomfort or SOB  BP elevated   Increase lisinopril to 10 mg daily   Continue all other medications as prescribed   F/U 6 months     3) Hx of PE  Repeat CT chest with resolution of thrombus     4) CAD s/p PCI of PDA  On ASA 81 mg daily, lisinopril 2.5 mg daily, metoprolol succinate 100 mg daily, atorvastatin 40 mg daily  Most recent cath with patent stent and no new CAD  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  BP elevated today    Increase lisinopril to 10 mg daily   Will reschedule stress echo - advised to hold metoprolol morning of test  Continue all other medications as prescribed   Check Lipid Panel and A1c  F/U 6 months      Scribe Attestation  By signing my name below, IKindra Scribe   attest that this documentation has been prepared under the direction and in the presence of Alber Bolaños MD.

## 2025-04-10 ENCOUNTER — APPOINTMENT (OUTPATIENT)
Dept: CARDIOLOGY | Facility: HOSPITAL | Age: 75
End: 2025-04-10
Payer: COMMERCIAL

## 2025-04-10 VITALS
BODY MASS INDEX: 32.98 KG/M2 | HEART RATE: 65 BPM | DIASTOLIC BLOOD PRESSURE: 90 MMHG | HEIGHT: 74 IN | SYSTOLIC BLOOD PRESSURE: 160 MMHG | OXYGEN SATURATION: 97 % | WEIGHT: 257 LBS

## 2025-04-10 DIAGNOSIS — I25.10 CORONARY ARTERY DISEASE INVOLVING NATIVE CORONARY ARTERY OF NATIVE HEART, UNSPECIFIED WHETHER ANGINA PRESENT: ICD-10-CM

## 2025-04-10 DIAGNOSIS — I48.0 PAROXYSMAL ATRIAL FIBRILLATION (MULTI): ICD-10-CM

## 2025-04-10 LAB
ATRIAL RATE: 65 BPM
P AXIS: 49 DEGREES
P OFFSET: 156 MS
P ONSET: 109 MS
PR INTERVAL: 208 MS
Q ONSET: 213 MS
QRS COUNT: 11 BEATS
QRS DURATION: 104 MS
QT INTERVAL: 394 MS
QTC CALCULATION(BAZETT): 409 MS
QTC FREDERICIA: 404 MS
R AXIS: -30 DEGREES
T AXIS: -2 DEGREES
T OFFSET: 410 MS
VENTRICULAR RATE: 65 BPM

## 2025-04-10 PROCEDURE — 3008F BODY MASS INDEX DOCD: CPT | Performed by: INTERNAL MEDICINE

## 2025-04-10 PROCEDURE — 1159F MED LIST DOCD IN RCRD: CPT | Performed by: INTERNAL MEDICINE

## 2025-04-10 PROCEDURE — 99213 OFFICE O/P EST LOW 20 MIN: CPT | Performed by: INTERNAL MEDICINE

## 2025-04-10 PROCEDURE — 3080F DIAST BP >= 90 MM HG: CPT | Performed by: INTERNAL MEDICINE

## 2025-04-10 PROCEDURE — 1160F RVW MEDS BY RX/DR IN RCRD: CPT | Performed by: INTERNAL MEDICINE

## 2025-04-10 PROCEDURE — 99213 OFFICE O/P EST LOW 20 MIN: CPT | Mod: 25 | Performed by: INTERNAL MEDICINE

## 2025-04-10 PROCEDURE — 93005 ELECTROCARDIOGRAM TRACING: CPT | Performed by: INTERNAL MEDICINE

## 2025-04-10 PROCEDURE — 3077F SYST BP >= 140 MM HG: CPT | Performed by: INTERNAL MEDICINE

## 2025-04-10 RX ORDER — LISINOPRIL 10 MG/1
10 TABLET ORAL DAILY
Qty: 90 TABLET | Refills: 3 | Status: SHIPPED | OUTPATIENT
Start: 2025-04-10 | End: 2026-04-10

## 2025-04-10 NOTE — PATIENT INSTRUCTIONS
For your blood pressure, increase lisinopril to 10 mg daily. A prescription for the new dose has been sent to your pharmacy.   Continue all other medications as prescribed.   When you reschedule your stress test, hold your metoprolol the morning of the test.  Please have blood work drawn.  Followup with Dr. Bolaños in 6 months.    If you have any questions or cardiac concerns, please call our office at [phone].

## 2025-04-10 NOTE — LETTER
April 10, 2025     Paolo Díaz DPM  [address] [facility] [address]    Patient: Alfred Díaz   YOB: 1950   Date of Visit: 4/10/2025       Dear Dr. Paolo Díaz DPM:    Thank you for referring Alfred Díaz to me for evaluation. Below are my notes for this consultation.  If you have questions, please do not hesitate to call me. I look forward to following your patient along with you.       Sincerely,     Alber Bolaños MD      CC: No Recipients  ______________________________________________________________________________________    Counseling:  The patient was counseled regarding diagnostic results, instructions for management, risk factor reductions, prognosis, patient and family education, impressions, risks and benefits of treatment options and importance of compliance with treatment.      Chief Complaint:   The patient presents today for 8-month followup of a-fib/flutter, CMP, and CAD.      History Of Present Illness:    Alfred Díaz is a 75 year old male patient who presents today for 8-month followup of a-fib/flutter, CMP, and CAD. His PMH is significant for CAD s/p PCI of RCA 08/22/2016 with repeat in 12/2021, HTN, BPH, hyperlipidemia, h/p PE, atrial flutter/a-fib with COVID pneumonia, CMP, and h/o non-Hodgkin's lymphoma 20 years ago (treated, in remission). Over the past 8 months, the patient states that he has done well from a cardiac standpoint. He denies any CP, chest discomfort, SOB or palpitations. 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 He believes his BP was elevated as he recently had a UTI and was on steroids. BP is elevated today. The patient is compliant with his prescribed medications.     Last Recorded Vitals:  Vitals:    04/10/25 1240 04/10/25 1309   BP: (!) 150/100 160/90   BP Location: Left arm    Patient Position: Sitting    BP Cuff Size: Adult    Pulse: 65    SpO2: 97%   "  Weight: 117 kg (257 lb)    Height: 1.88 m (6' 2\")        Past Surgical History:  He has a past surgical history that includes Tonsillectomy (06/06/2018); Adenoidectomy (06/06/2018); Lymph node biopsy (06/06/2018); Other surgical history (04/06/2020); Coronary angioplasty (06/07/2018); and Ankle surgery (06/07/2018).      Social History:  He reports that he has never smoked. He has never used smokeless tobacco. He reports that he does not currently use alcohol. He reports that he does not use drugs.    Family History:  No family history on file.     Allergies:  Patient has no known allergies.    Outpatient Medications:  Current Outpatient Medications   Medication Instructions   • aspirin 81 mg, oral, Daily   • atorvastatin (LIPITOR) 40 mg, oral, Daily   • lisinopril 10 mg, oral, Daily   • metoprolol succinate XL (TOPROL-XL) 100 mg, oral, Daily   • sildenafil (VIAGRA) 100 mg, oral, As needed, DAILY 1 HOUR BEFORE NEEDED     Review of Systems   All other systems reviewed and are negative.     Physical Exam:  Constitutional:       Appearance: Healthy appearance. Not in distress.   Neck:      Vascular: No JVR. JVD normal.   Pulmonary:      Effort: Pulmonary effort is normal.      Breath sounds: Normal breath sounds. No wheezing. No rhonchi. No rales.   Chest:      Chest wall: Not tender to palpatation.   Cardiovascular:      PMI at left midclavicular line. Normal rate. Regular rhythm. Normal S1. Normal S2.       Murmurs: There is no murmur.      No gallop.  No click. No rub.   Pulses:     Intact distal pulses.   Edema:     Peripheral edema absent.   Abdominal:      General: Bowel sounds are normal.      Palpations: Abdomen is soft.      Tenderness: There is no abdominal tenderness.   Musculoskeletal: Normal range of motion.         General: No tenderness. Skin:     General: Skin is warm and dry.   Neurological:      General: No focal deficit present.      Mental Status: Alert and oriented to person, place and time. "          Last Labs:  CBC -  Lab Results   Component Value Date    WBC 4.3 (L) 02/06/2023    HGB 14.2 02/06/2023    HCT 40.9 (L) 02/06/2023    MCV 86 02/06/2023     02/06/2023       CMP -  Lab Results   Component Value Date    CALCIUM 9.8 03/27/2025    PROT 7.0 03/27/2025    ALBUMIN 4.7 03/27/2025    AST 15 03/27/2025    ALT 16 03/27/2025    ALKPHOS 86 03/27/2025    BILITOT 1.2 03/27/2025       LIPID PANEL -   Lab Results   Component Value Date    CHOL 135 02/08/2024    TRIG 81 02/08/2024    HDL 51.0 02/08/2024    CHHDL 2.8 09/01/2023    LDLF 72 09/01/2023    VLDL 20 09/01/2023       RENAL FUNCTION PANEL -   Lab Results   Component Value Date    GLUCOSE 103 (H) 03/27/2025     03/27/2025    K 4.0 03/27/2025     03/27/2025    CO2 29 03/27/2025    ANIONGAP 8 03/27/2025    BUN 16 03/27/2025    CREATININE 0.84 03/27/2025    GFRMALE >90 02/06/2023    CALCIUM 9.8 03/27/2025    ALBUMIN 4.7 03/27/2025        Lab Results   Component Value Date     (H) 11/06/2021    HGBA1C 6.8 (H) 03/27/2025       Last Cardiology Tests:  01/29/2024 - Exercise Stress Test  1. Adequate level of stress achieved.  2. No clinical or electrocardiographic evidence for ischemia at maximal workload.  3. No ECG changes from baseline.    02/07/2023 - Cardiac Catheterization (LH)  1. Non obstructive CAD in a right dominant system. Previous patent stent in the RCA.  2. Left Ventricular end-diastolic pressure = 16 mmHg.     01/31/2023 - Exercise Stress Test  1. Abnormal Stress Test.  2. The adequate level of stress was achieved.  3. The Licona score is 4.  4. ECG changes consistent with ischemia.      04/12/2022 to 04/14/2022 - Holter Monitor  1. Primary rhythm was sinus rhythm; avg HR 64 bpm, min HR 44 bpm, max  bpm.  2. PSVC(s): Clyde was 0.15%.  3. SVT (AT, RT): 7 events; longest event 8 beats, fastest event 138 bpm.  4. PVC(s): Clyde was 0.17%.     03/03/2022 - TTE  1. The left ventricular systolic function is normal with  a 60-65% estimated ejection fraction.  2. Spectral Doppler shows an impaired relaxation pattern of left ventricular diastolic filling.  3. Severely enlarged right ventricle.  4. The left atrium is moderately dilated.     03/03/2022 - CTA Chest for PE  1. Limited evaluation for pulmonary embolism. No definite large or obvious pulmonary embolism is seen. However a small pulmonary embolism not excluded given suboptimal contrast phase.  2. Tiny area of atelectasis or infiltrate in the right lung base. Consider short-term follow-up to document resolution.  3. Coronary artery calcification.     01/21/2022 - Exercise Stress Test  1. Non-diagnostic due to abnormal baseline EKG. Hypertensive blood pressure response.  2. The adequate level of stress was achieved.     12/16/2021 - Cardiac Catheterization (LH)  1. Single vessel coronary artery disease without proximal left anterior descending involvement.  2. The right posterior descending artery showed severe atherosclerotic disease.  3. Culprit vessel(s): right posterior descending artery.  4. Successful PCI of PDA.     12/15/2021 - TTE  1. The left ventricular systolic function is mildly decreased with a 40-45% estimated ejection fraction.  2. There is global hypokinesis of the left ventricle with minor regional variations.     12/14/2021 - Electrophysiology Procedure  1. 5MG IV METOPROLOL TARTRATE GIVEN IVP PRIOR TO CARDIOVERSION FOR RATE CONTROL.  2. Successful direct current cardioversion for atrial fibrillation, resulting in normal sinus rhythm.     11/23/2021 - SÁNCHEZ  1. The left ventricular systolic function is moderately decreased with a 30-35% estimated ejection fraction.  2. Successful direct current cardioversion for atrial fibrillation resulting in normal sinus rhythm.  3. The left atrium is severely dilated.  4. No left atrial thrombus.  5. There is global hypokinesis of the left ventricle with minor regional variations.     11/06/2021 - CXR  Airspace disease right  lung base. Suspect early fluid overload.     11/05/2021 - CTA Chest for PE  1. Single subsegmental pulmonary embolism right lower lobe and single segmental pulmonary embolism left upper lobe extending into proximal segmental branches. No evidence of right heart strain or pulmonary infarction.  2. Severe pulmonary edema and small bilateral pleural effusions.  3. Peripancreatic stranding/edema, suboptimally assessed due to contrast timing and partial visualization and could be due to save anasarca the recommend correlation with pancreatic enzymes.     11/05/2021 - CXR  1. Bilateral symmetric interstitial opacities representing interlobular septal thickening with perihilar congestion suggestive of interstitial edema.  2. Ill-defined bibasilar airspace opacities may relate to alveolar edema or pneumonia given known positive COVID-19 status.     11/05/2021 - U/S Duplex Lower Extremity Veins, Bilateral  1. No evidence for DVT within the right lower extremity.  2. Two right Shelby's cysts.     11/05/2021 - TTE  1. The left ventricular systolic function is moderately decreased with a 35-40% estimated ejection fraction.  2. There is global hypokinesis of the left ventricle with minor regional variations.     07/30/2020 - Exercise Stress Test  1. Probably normal graded exercise stress test.  2. Interpretation limited by motion artifact.  3. If clinically indicated, would consider stress testing via alternate modality, such as Lexiscan or dobutamine stress testing.  4. No clinical evidence for ischemia at maximal workload.  5. The adequate level of stress was achieved.     07/05/2018 - Echocardiogram   1. Normal left ventricular size and regional systolic function with an ejection fraction of 55%.  2. Moderate concentric left ventricular hypertrophy.  3. Normal right ventricular systolic function.      08/22/2016 - Cardiac Catheterization   1. LVEDP elevated at 24 mmHg.  2. Left Ventricle: The estimated ejection fraction is  40%. Hypokinesis of the diaphragmatic myocardium.  3. Right dominant coronary system.   4. Right Coronary: Mid-vessel lesion. There is a 99% stenosis. The lesion was stented. Following intervention, the lesion has a residual stenosis of 0%, an excellent angiographic appearance, and NARCISA grade 3 flow (brisk flow).  5. Left to right collaterals.    Lab review: I have personally reviewed the laboratory result(s).     Assessment/Plan  1) Atrial Flutter/Fib with COVID Pneumonia s/p Cardioversion  On ASA 81 mg daily, metoprolol succinate 100 mg daily   Holter for 24 hrs - In NSR  Off anticoagulation  I suspect a-fib was precipitated by COVID  Denies palpitations  In NSR  Continue current medical Rx   F/U 6 months     2) Cardiomyopathy  On ASA 81 mg daily, lisinopril 2.5 mg daily, metoprolol succinate 100 mg daily   Possibly rate related due to a-fib  EF normalized  Denies CP, chest discomfort or SOB  BP elevated   Increase lisinopril to 10 mg daily   Continue all other medications as prescribed   F/U 6 months     3) Hx of PE  Repeat CT chest with resolution of thrombus     4) CAD s/p PCI of PDA  On ASA 81 mg daily, lisinopril 2.5 mg daily, metoprolol succinate 100 mg daily, atorvastatin 40 mg daily  Most recent cath with patent stent and no new CAD  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  BP elevated today    Increase lisinopril to 10 mg daily   Will reschedule stress echo - advised to hold metoprolol morning of test  Continue all other medications as prescribed   Check Lipid Panel and A1c  F/U 6 months      Scribe Attestation  By signing my name below, I, Katherine Munoz   attest that this documentation has been  prepared under the direction and in the presence of Alber Bolaños MD.

## 2025-04-15 ENCOUNTER — HOSPITAL ENCOUNTER (OUTPATIENT)
Dept: CARDIOLOGY | Facility: HOSPITAL | Age: 75
Discharge: HOME | End: 2025-04-15
Payer: MEDICARE

## 2025-04-15 DIAGNOSIS — R94.39 ABNORMAL STRESS TEST: Primary | ICD-10-CM

## 2025-04-15 DIAGNOSIS — I25.10 CORONARY ARTERY DISEASE INVOLVING NATIVE CORONARY ARTERY OF NATIVE HEART, UNSPECIFIED WHETHER ANGINA PRESENT: ICD-10-CM

## 2025-04-15 DIAGNOSIS — I48.0 PAROXYSMAL ATRIAL FIBRILLATION (MULTI): ICD-10-CM

## 2025-04-15 PROCEDURE — 93018 CV STRESS TEST I&R ONLY: CPT | Performed by: INTERNAL MEDICINE

## 2025-04-15 PROCEDURE — 93350 STRESS TTE ONLY: CPT | Performed by: INTERNAL MEDICINE

## 2025-04-15 PROCEDURE — 93017 CV STRESS TEST TRACING ONLY: CPT

## 2025-04-15 PROCEDURE — 2500000004 HC RX 250 GENERAL PHARMACY W/ HCPCS (ALT 636 FOR OP/ED): Performed by: INTERNAL MEDICINE

## 2025-04-15 PROCEDURE — 93016 CV STRESS TEST SUPVJ ONLY: CPT | Performed by: INTERNAL MEDICINE

## 2025-04-15 RX ADMIN — PERFLUTREN 8 ML OF DILUTION: 6.52 INJECTION, SUSPENSION INTRAVENOUS at 13:35

## 2025-04-16 PROBLEM — R94.39 ABNORMAL STRESS TEST: Status: ACTIVE | Noted: 2025-04-15

## 2025-04-21 NOTE — TELEPHONE ENCOUNTER
----- Message from Kacey Allen sent at 4/16/2025  4:01 PM EDT -----  Regarding: RE: needs scheduled  [facility] @930amThank you!  ----- Message -----  From: Becka Modi RN  Sent: 4/16/2025   9:53 AM EDT  To: Nahed Monreal  Subject: needs scheduled                                  Needs scheduled for his cath

## 2025-04-21 NOTE — TELEPHONE ENCOUNTER
Calling instructions for upcoming procedure: left heart catheterization    Include review of date 4/29 and arrival time 0830.        Labs up to date? Discussed with patient - labs need to be completed. Verified there are active lab orders.    For GFR less than 60, elderly, DDM, single kidney, transplanted kidney has hydration been ordered/addressed?  Also provider may consider holding diuretic/ACE/ARB in these patients - review with Cath lab NP or preforming provider.    For GFR 30 or less and not on dialysis -discuss with performing provider if they want to direct admit the night before LHC for overnight hydration    Please make sure Cath lab NP or preforming provider have been notified of any patient with a single kidney or transplanted kidney.  Anti rejection medications need to be carefully considered to give/hold.    Does patient require dye prep?  Do not need dye prep for shellfish allergy, only for contrast allergy. Patient does not require dye prep.    Check for medications that need to be held or adjusted:    *Hold diuretics day of cath.  May give ACE/ARB hydrochlorothiazide combination medication for patients with normal renal function.    *Anticoagulation:  Patient denies taking.       If on OAC for DVT/PE check for need for bridging. If on for Afib check to make sure they haven’t had a cardioversion in the last 4-6 weeks.       If on Coumadin- check for indication- if on it for mechanical valve - reach out to anticoagulation clinic for bridging recommendations or check with performing provider.    *Diabetic medications:          Metformin or metformin combination medication:  Patient denies taking.         Glitazones/sulfonylureas/gliptins:  Patient denies taking.         Insulin:  Patient denies taking.    Insulin pump- okay to wear on the way into cath lab. Pump will need to be discontinued during the heart cath. Pump can be resumed after the cath under nursing supervision. Will need a responsible  adult that knows how to use machine for 24 hours after cath, because of sedation.    Continuous Glucose monitors- remove prior to coming into cath lab. If you are not due to change the CGM, patient can elect to keep it on but we are not responsible if sensor is damaged and we ask that you verify your CGM with a glucometer over the next 24 to 48 hours. If you are due to change the CGM, its best to remove and then reapply when you go home.    *PDE5 inhibitors: Cialis and Viagra  Viagra (Sildenafil) - Hold 24 hours before cath    *Suboxone- check with provider, they may want anesthesia for the procedure. If they do not want anesthesia they should hold Suboxone the morning of the procedure.    Further instructions:  Nothing to eat or drink after midnight except ASA/Metoprolol/lisinopril with a sip of water the morning of the cath.  (Okay to give calcium channel blockers, beta blockers, vasodilators, antiarrhythmics including: amiodarone, tikosyn, and sotalol)  You will need a responsible adult .    Bring your photo ID, insurance cards an accurate list of the medications you are currently taking.    Wear loose, comfortable clothing.    There is a possibility of staying over night for observation so make arrangements and pack a bag with necessities for that just incase.      Patient correctly repeated instructions, verbalized understanding and is agreeable to the plan.

## 2025-04-27 LAB
ANION GAP SERPL CALCULATED.4IONS-SCNC: 8 MMOL/L (CALC) (ref 7–17)
BUN SERPL-MCNC: 19 MG/DL (ref 7–25)
BUN/CREAT SERPL: ABNORMAL (CALC) (ref 6–22)
CALCIUM SERPL-MCNC: 9.1 MG/DL (ref 8.6–10.3)
CHLORIDE SERPL-SCNC: 105 MMOL/L (ref 98–110)
CO2 SERPL-SCNC: 27 MMOL/L (ref 20–32)
CREAT SERPL-MCNC: 0.92 MG/DL (ref 0.7–1.28)
EGFRCR SERPLBLD CKD-EPI 2021: 87 ML/MIN/1.73M2
ERYTHROCYTE [DISTWIDTH] IN BLOOD BY AUTOMATED COUNT: 12.8 % (ref 11–15)
GLUCOSE SERPL-MCNC: 139 MG/DL (ref 65–99)
HCT VFR BLD AUTO: 41 % (ref 38.5–50)
HGB BLD-MCNC: 13.9 G/DL (ref 13.2–17.1)
MCH RBC QN AUTO: 31.1 PG (ref 27–33)
MCHC RBC AUTO-ENTMCNC: 33.9 G/DL (ref 32–36)
MCV RBC AUTO: 91.7 FL (ref 80–100)
PLATELET # BLD AUTO: 167 THOUSAND/UL (ref 140–400)
PMV BLD REES-ECKER: 9.7 FL (ref 7.5–12.5)
POTASSIUM SERPL-SCNC: 4.3 MMOL/L (ref 3.5–5.3)
RBC # BLD AUTO: 4.47 MILLION/UL (ref 4.2–5.8)
SODIUM SERPL-SCNC: 140 MMOL/L (ref 135–146)
WBC # BLD AUTO: 3.8 THOUSAND/UL (ref 3.8–10.8)

## 2025-04-29 ENCOUNTER — HOSPITAL ENCOUNTER (OUTPATIENT)
Facility: HOSPITAL | Age: 75
Setting detail: OUTPATIENT SURGERY
Discharge: HOME | End: 2025-04-29
Attending: INTERNAL MEDICINE | Admitting: INTERNAL MEDICINE
Payer: COMMERCIAL

## 2025-04-29 VITALS
DIASTOLIC BLOOD PRESSURE: 98 MMHG | OXYGEN SATURATION: 98 % | BODY MASS INDEX: 32.08 KG/M2 | HEART RATE: 56 BPM | TEMPERATURE: 96.8 F | SYSTOLIC BLOOD PRESSURE: 191 MMHG | RESPIRATION RATE: 16 BRPM | HEIGHT: 74 IN | WEIGHT: 250 LBS

## 2025-04-29 DIAGNOSIS — I20.9 ANGINA PECTORIS, UNSPECIFIED: ICD-10-CM

## 2025-04-29 DIAGNOSIS — R94.30 ABNORMAL RESULT OF CARDIOVASCULAR FUNCTION STUDY, UNSPECIFIED: ICD-10-CM

## 2025-04-29 DIAGNOSIS — I25.10 CORONARY ARTERY DISEASE INVOLVING NATIVE CORONARY ARTERY OF NATIVE HEART WITHOUT ANGINA PECTORIS: ICD-10-CM

## 2025-04-29 DIAGNOSIS — R94.39 ABNORMAL STRESS TEST: Primary | ICD-10-CM

## 2025-04-29 PROCEDURE — 2500000004 HC RX 250 GENERAL PHARMACY W/ HCPCS (ALT 636 FOR OP/ED): Performed by: INTERNAL MEDICINE

## 2025-04-29 PROCEDURE — C1887 CATHETER, GUIDING: HCPCS | Performed by: INTERNAL MEDICINE

## 2025-04-29 PROCEDURE — 2500000004 HC RX 250 GENERAL PHARMACY W/ HCPCS (ALT 636 FOR OP/ED): Mod: JZ | Performed by: NURSE PRACTITIONER

## 2025-04-29 PROCEDURE — 2500000005 HC RX 250 GENERAL PHARMACY W/O HCPCS: Performed by: INTERNAL MEDICINE

## 2025-04-29 PROCEDURE — 7100000009 HC PHASE TWO TIME - INITIAL BASE CHARGE: Performed by: INTERNAL MEDICINE

## 2025-04-29 PROCEDURE — 96373 THER/PROPH/DIAG INJ IA: CPT | Performed by: INTERNAL MEDICINE

## 2025-04-29 PROCEDURE — 2720000007 HC OR 272 NO HCPCS: Performed by: INTERNAL MEDICINE

## 2025-04-29 PROCEDURE — C1760 CLOSURE DEV, VASC: HCPCS | Performed by: INTERNAL MEDICINE

## 2025-04-29 PROCEDURE — 2500000001 HC RX 250 WO HCPCS SELF ADMINISTERED DRUGS (ALT 637 FOR MEDICARE OP): Performed by: NURSE PRACTITIONER

## 2025-04-29 PROCEDURE — 93454 CORONARY ARTERY ANGIO S&I: CPT | Performed by: INTERNAL MEDICINE

## 2025-04-29 PROCEDURE — 7100000010 HC PHASE TWO TIME - EACH INCREMENTAL 1 MINUTE: Performed by: INTERNAL MEDICINE

## 2025-04-29 PROCEDURE — C1894 INTRO/SHEATH, NON-LASER: HCPCS | Performed by: INTERNAL MEDICINE

## 2025-04-29 PROCEDURE — 2550000001 HC RX 255 CONTRASTS: Performed by: INTERNAL MEDICINE

## 2025-04-29 RX ORDER — ACETAMINOPHEN 160 MG/5ML
650 SOLUTION ORAL EVERY 6 HOURS PRN
Status: CANCELLED | OUTPATIENT
Start: 2025-04-29

## 2025-04-29 RX ORDER — LISINOPRIL 10 MG/1
10 TABLET ORAL ONCE
Status: COMPLETED | OUTPATIENT
Start: 2025-04-29 | End: 2025-04-29

## 2025-04-29 RX ORDER — ACETAMINOPHEN 650 MG/1
650 SUPPOSITORY RECTAL EVERY 6 HOURS PRN
Status: CANCELLED | OUTPATIENT
Start: 2025-04-29

## 2025-04-29 RX ORDER — SODIUM CHLORIDE 9 MG/ML
1000 INJECTION, SOLUTION INTRAVENOUS ONCE AS NEEDED
Status: CANCELLED | OUTPATIENT
Start: 2025-04-29 | End: 2025-04-29

## 2025-04-29 RX ORDER — SODIUM CHLORIDE 9 MG/ML
1 INJECTION, SOLUTION INTRAVENOUS CONTINUOUS
Status: CANCELLED | OUTPATIENT
Start: 2025-04-29 | End: 2025-04-29

## 2025-04-29 RX ORDER — SODIUM CHLORIDE 9 MG/ML
100 INJECTION, SOLUTION INTRAVENOUS CONTINUOUS
Status: ACTIVE | OUTPATIENT
Start: 2025-04-29 | End: 2025-04-29

## 2025-04-29 RX ORDER — FENTANYL CITRATE 50 UG/ML
INJECTION, SOLUTION INTRAMUSCULAR; INTRAVENOUS AS NEEDED
Status: DISCONTINUED | OUTPATIENT
Start: 2025-04-29 | End: 2025-04-29 | Stop reason: HOSPADM

## 2025-04-29 RX ORDER — MORPHINE SULFATE 2 MG/ML
2 INJECTION, SOLUTION INTRAMUSCULAR; INTRAVENOUS EVERY 6 HOURS PRN
Refills: 0 | Status: CANCELLED | OUTPATIENT
Start: 2025-04-29

## 2025-04-29 RX ORDER — HEPARIN SODIUM 1000 [USP'U]/ML
INJECTION, SOLUTION INTRAVENOUS; SUBCUTANEOUS AS NEEDED
Status: DISCONTINUED | OUTPATIENT
Start: 2025-04-29 | End: 2025-04-29 | Stop reason: HOSPADM

## 2025-04-29 RX ORDER — LIDOCAINE HYDROCHLORIDE 20 MG/ML
INJECTION, SOLUTION INFILTRATION; PERINEURAL AS NEEDED
Status: DISCONTINUED | OUTPATIENT
Start: 2025-04-29 | End: 2025-04-29 | Stop reason: HOSPADM

## 2025-04-29 RX ORDER — HYDRALAZINE HYDROCHLORIDE 20 MG/ML
10 INJECTION INTRAMUSCULAR; INTRAVENOUS ONCE
Status: COMPLETED | OUTPATIENT
Start: 2025-04-29 | End: 2025-04-29

## 2025-04-29 RX ORDER — ACETAMINOPHEN 325 MG/1
650 TABLET ORAL EVERY 6 HOURS PRN
Status: CANCELLED | OUTPATIENT
Start: 2025-04-29

## 2025-04-29 RX ORDER — MIDAZOLAM HYDROCHLORIDE 1 MG/ML
INJECTION, SOLUTION INTRAMUSCULAR; INTRAVENOUS AS NEEDED
Status: DISCONTINUED | OUTPATIENT
Start: 2025-04-29 | End: 2025-04-29 | Stop reason: HOSPADM

## 2025-04-29 RX ADMIN — HYDRALAZINE HYDROCHLORIDE 10 MG: 20 INJECTION INTRAMUSCULAR; INTRAVENOUS at 10:08

## 2025-04-29 RX ADMIN — SODIUM CHLORIDE 100 ML/HR: 9 INJECTION, SOLUTION INTRAVENOUS at 08:21

## 2025-04-29 RX ADMIN — LISINOPRIL 10 MG: 10 TABLET ORAL at 09:21

## 2025-04-29 ASSESSMENT — PAIN SCALES - GENERAL
PAINLEVEL_OUTOF10: 0 - NO PAIN

## 2025-04-29 ASSESSMENT — PAIN - FUNCTIONAL ASSESSMENT: PAIN_FUNCTIONAL_ASSESSMENT: 0-10

## 2025-04-29 ASSESSMENT — COLUMBIA-SUICIDE SEVERITY RATING SCALE - C-SSRS
1. IN THE PAST MONTH, HAVE YOU WISHED YOU WERE DEAD OR WISHED YOU COULD GO TO SLEEP AND NOT WAKE UP?: NO
6. HAVE YOU EVER DONE ANYTHING, STARTED TO DO ANYTHING, OR PREPARED TO DO ANYTHING TO END YOUR LIFE?: NO
2. HAVE YOU ACTUALLY HAD ANY THOUGHTS OF KILLING YOURSELF?: NO

## 2025-04-29 NOTE — Clinical Note
Closure device placed in the right radial artery. Site closed by radial compression system. Deployed By: Cj Aguilar, [id number] air no

## 2025-04-29 NOTE — DISCHARGE INSTRUCTIONS
We have ordered you an exercise stress test. Please call to schedule.     If you have any questions, please call the Cath Lab Nurse Practitioner Mena Rothmanl at [phone] and leave a message. She will return your call the same day if calling before 3 PM M-F. If you call on the weekend you can expect a call back on Monday morning. You may also call the Cath Lab at [phone] M-F, 7-3:30 with any questions. Weekends and after hours please call your cardiologist office number to reach a physician on call.          CARDIAC CATHETERIZATION DISCHARGE INSTRUCTIONS     FOR SUDDEN AND SEVERE CHEST PAIN, SHORTNESS OF BREATH, EXCESSIVE BLEEDING, SIGNS OF STROKE, OR CHANGES IN MENTAL STATUS YOU SHOULD CALL 911 IMMEDIATELY.     If your provider has prescribed aspirin and/or clopidogrel (Plavix), or prasugrel (Effient), or ticagrelor (Brilinta), DO NOT STOP THESE MEDICATIONS for any reason without talking to your cardiologist first. If any of these were prescribed, you must take them every day without missing a single dose. If you are getting low on these medications, contact your provider immediately for a refill.     FOR NEXT 24 HOURS  - Upon discharge, you should return home and rest for the remainder of the day and evening. You do not have to stay on bed rest but should not be very active.  It is recommended a responsible adult be with you for the first 24 hours after the procedure.    - No driving for 24 hours after procedure. Please arrange for someone to drive you home from the hospital today.     - Do not drive, operate machinery, or use power tools for 24 hours after your procedure.     - Do not make any legal decisions for 24 hours after your procedure.     - Do not drink alcoholic beverages for 24 hours after your procedure.    WOUND CARE   *FOR FEMORAL (LEG) ACCESS*  ·      Avoid heavy lifting (over 10 pounds) for 7 days, squatting or excessive bending for 2 days, and strenuous exercise for 7 days.  ·      No  submerged bathing, swimming, or hot tubs for the next 7 days, or until fully healed.  ·      Avoid sexual activity for 3-4 days until any groin discomfort has ceased.     *FOR RADIAL (WRIST) ACCESS*  ·      No excessive use of the wrist for 24 hours - for example, treat your wrist as if it is sprained.  '      Do not lift anymore than 5 lbs. For 5 days with the same arm as your wrist/access site is on.  ·      Do not engage in vigorous activities (tennis, golf, bowling, weights) for at least 48 hours after the procedure.  ·      Do not submerge the wrist for 7 days after the procedure.  ·      You should expect mild tingling in your hand and tenderness at the puncture site for up to 3 days.    - The transparent dressing should be removed from the site 24 hours after the procedure.  Wash the site gently with soap and water. Rinse well and pat dry. Keep the area clean and dry. You may apply a Band-Aid to the site. Avoid lotions, ointments, or powders until fully healed.     - You may shower the day after your procedure.      - It is normal to notice a small bruise around the puncture site and/or a small grape sized or smaller lump. Any large bruising or large lump warrants a call to the office.     - If bleeding should occur, lay down and apply pressure to the affected area for 10 minutes.  If the bleeding stops notify your physician.  If there is a large amount of bleeding or spurting of blood CALL 911 immediately.  DO NOT drive yourself to the hospital.    - You may experience some tenderness, bruising or minimal inflammation.  If you have any concerns, you may contact the Cath Lab or if any of these symptoms become excessive, contact your cardiologist or go to the emergency room.     OTHER INSTRUCTIONS  - You may take acetaminophen (Tylenol) as directed for discomfort.  If pain is not relieved with acetaminophen (Tylenol), contact your doctor.    - If you notice or experience any of the following, you should notify  your doctor or seek medical attention  Chest pain or discomfort  Change in mental status or weakness in extremities.  Dizziness, light headedness, or feeling faint.  Change in the site where the procedure was performed, such as bleeding or an increased area of bruising or swelling.  Tingling, numbness, pain, or coolness in the leg/arm beyond the site where the procedure was performed.  Signs of infection (i.e. shaking chills, temperature > 100 degrees Fahrenheit, warmth, redness) in the leg/arm area where the procedure was performed.  Changes in urination   Bloody or black stools  Vomiting blood  Severe nose bleeds  Any excessive bleeding    - If you DO NOT have an appointment with your cardiologist within 2-4 weeks following your procedure, please contact their office.      Lifestyle Recommendations for a Healthier Life:    The following lifestyle changes can have a significant positive impact on your overall health - helping you to achieve healthy weight, lower metabolic and heart disease risk and manage stress more effectively:      1. Eat whole, fresh foods. Food is one of the biggest drivers of our overall health.  Make your meals whole foods based, focusing on a wide variety of non starchy vegetables and fruits.  It also beneficial to include various nuts, seeds and high-quality animal proteins for overall balanced diet.    2. Remove the sweeteners from your diet.  Artificial sweeteners have a negative impact on our metabolic health - they can cause increase in both glucose and insulin, increasing the risk or potential for insulin resistance and abnormal blood sugar levels.  Artificial sweeteners are also excessively sweeter than regular sugar, they trick our taste buds into craving extreme forms of sweet, like an addiction.  I encourage you to avoid sugar, but also stevia, aspartame, sucralose, sugar alcohols like xylitol and maltitol.    3. Get rid of the inflammatory factors in your diet - this mainly comes  from sugars of all kinds and refined vegetable oils.  These can increase our risk for changes in our metabolic health which can lead to diabetes, heart disease and other chronic disease.  You can reverse or combat the effective of inflammatory foods by increasing your intake of anti-inflammatory foods like wild-caught fish, fresh ground flax seed, fish oil and variety of fruits & vegetables.      4. Eat plenty of fiber!!  The standard American diet has very low levels of daily dietary fiber, many people barely get 15 grams per day.  There is plenty of nutrition research that shows how high-fiber foods can help lower our blood sugar.   Eat a wide variety of fiber-rich plant-based foods including nuts, seeds, fruits, vegetables, and legumes.    5. Get enough sleep. Studies from experts in endocrinology & metabolism have shown that even a few nights of poor sleep can increase the risk of insulin resistance and abnormal blood sugar values. Make sleep a priority - it is an important factor in your health.  Develop a sleep routine including: avoid eating two-three hours before bed, practice relaxation techniques (warm bath, meditation, yoga, mindfulness) to help relax your muscles and prepare you for sleep.    Go to bed and wake up at consistent times.  Avoid screen time for at least 60-90 minutes before bedtime.    6. Make exercise part of your regular routine.  Exercise helps us manage blood sugar more effectively and makes our cells more receptive to the effect of the insulin our body makes (lowers blood sugar).  Regular aerobic exercise AND interval or strength training are ideal to help maintain a healthy weight, metabolism & lower the risk of chronic disease.      7. Control stress levels. Chronic stress can lead to elevated blood sugar, elevated blood pressure, accumulation of fat around the belly and these things increase the risk for metabolic syndrome, diabetes and heart disease.  We all have various levels of  stress in our lives, we can't control all of it, but we can control how we respond to it and manage it's impact.  Find healthy outlets for stress management like meditation, yoga, deep breathing, or exercise.    Home BP monitoring instructions:   Goal < 130 / 80   Remain still, Avoid smoking, caffeinated beverages, or exercise within 30 min before BP measurements.  Ensure 5 min of quiet rest before BP measurements.  Sit correctly with back straight and supported (on a straight-backed dining chair, for example, rather than a sofa).  Sit with feet flat on the floor and legs uncrossed.  Keep arm supported on a flat surface (such as a table), with the upper arm at heart level.  Bottom of the cuff should be placed directly above the bend of the elbow  Take a reading in the AM before breakfast 2-3 times / week   Record all readings accurately:  A written log should be brought to all appointments   Monitors with built-in memory should be brought to all clinic appointments and calibrated to our machines      Weight Management:  Since food equals calories, in order to lose weight you must either eat fewer calories, exercise more to burn off calories with activity, or both. Food that is not used to fuel the body is stored as fat.    A major component of losing weight is to make smarter food choices. Here's how:    Limit non-nutritious foods, such as:  Sugar, honey, syrups and candy  Pastries, donuts, pies, cakes and cookies  Soft drinks, sweetened juices and alcoholic beverages    Cut down on high-fat foods by:  Choosing poultry, fish or lean red meat  Choosing low-fat cooking methods, such as baking, broiling, steaming, grilling and boiling  Using low-fat or non-fat dairy products  Using vinaigrette, herbs, lemon or fat-free salad dressings  Avoiding fatty meats, such as navarrete, sausage, rosen, ribs and luncheon meats  Avoiding high-fat snacks like nuts, chips and chocolate  Avoiding fried foods  Using less butter,  margarine, oil and mayonnaise  Avoiding high-fat gravies, cream sauces and cream-based soups    Eat a variety of foods, including:  Fruit and vegetables that are raw, steamed or baked  Whole grains, breads, cereal, rice and pasta  Dairy products, such as low-fat or non-fat milk or yogurt, low-fat cottage cheese and low-fat cheese  Protein-rich foods like chicken, turkey, fish, lean meat and legumes, or beans    Change your eating habits:  Eat three balanced meals a day to help control your hunger  Watch portion sizes and eat small servings of a variety of foods  Choose low-calorie snacks  Eat only when you are hungry and stop when you are satisfied  Eat slowly and try not to perform other tasks while eating  Find other activities to distract you from food, such as walking, taking up a hobby or being involved in the community  Include regular exercise in your daily routine  Find a support group, if necessary, for emotional support in your weight loss effort    Patient Education: Smoking Cessation  Making the commitment to quit smoking is one of the best choices that smokers can make for themselves, but also a difficult one. There are various opportunities for support available. Here is an overview of them.  There are various forms of nicotine replacement therapy available to assist with minimizing these symptoms. They are nicotine gum, nicotine patch, nicotine nasal spray, nicotine inhaler, and nicotine lozenge.  Which kind of nicotine replacement therapy will best work for you can be discussed with your doctor or nurse to help you understand the pros and cons of each.  Non-nicotine replacement therapy is also available. Bupropion (Wellbutrin, Zyban) is a mild anti-depressant that is also effective in helping people to quit smoking. It can be used alone or with nicotine replacement therapy.   Varenicline (Chantix) is another medication that helps people who what to quit. This medication is not a form of nicotine  replacement therapy, but it helps with the withdrawal symptoms.  Studies have shown that the best success rates for people trying to quit include counseling and/or support groups such as the National [employer] that is a free, confidential, 24/7, 365-day-a-year treatment referral and information service:  6-947-493-HELP (6491)    Some helpful hints include:  Avoidance. Stay away from smokers and places where you are tempted to smoke.  Activities. Exercise or do hobbies that keep your hands busy.  Alternatives. Use oral substitutes such as sugarless gum, hard candy, and carrot sticks.  Change of habits. For example, if you usually smoke during a coffee break, then go for a walk instead.  Deep breathing. When you have the urge to smoke, do a deep breathing exercise and remind yourself why you quit.  Delay tactic. If you feel that you are about to light up, delay. Tell yourself you must wait 10 minutes. Often this simple trick will allow you to move beyond the urge  Discussion. Call a friend for support.  Drink of water. Use as an oral substitute such as water.  Many people say the reason they smoke is to deal with stress. Unfortunately, stress is a part of all our lives. When quitting, you will need to find new strategies to deal with stress. There are stress-management classes, and self-help books that can help you discover new ways to reduce and deal with stress.  The bottom line is: don't just try to go it alone-reach out for support to help you achieve your goal.

## 2025-05-16 DIAGNOSIS — N52.9 ERECTILE DYSFUNCTION, UNSPECIFIED ERECTILE DYSFUNCTION TYPE: ICD-10-CM

## 2025-05-20 DIAGNOSIS — N52.9 ERECTILE DYSFUNCTION, UNSPECIFIED ERECTILE DYSFUNCTION TYPE: ICD-10-CM

## 2025-05-20 DIAGNOSIS — I25.10 CORONARY ARTERY DISEASE INVOLVING NATIVE CORONARY ARTERY OF NATIVE HEART WITHOUT ANGINA PECTORIS: Primary | ICD-10-CM

## 2025-05-20 DIAGNOSIS — I48.0 PAROXYSMAL ATRIAL FIBRILLATION (MULTI): ICD-10-CM

## 2025-05-20 RX ORDER — SILDENAFIL 100 MG/1
100 TABLET, FILM COATED ORAL AS NEEDED
Qty: 20 TABLET | Refills: 6 | Status: SHIPPED | OUTPATIENT
Start: 2025-05-20 | End: 2025-06-09

## 2025-05-20 NOTE — TELEPHONE ENCOUNTER
RN spoke with  staff and Dr. Bolaños. Pt is requesting a new 7 day holter monitor for the [employer]. Per Dr. Bolaños pt is ok to have another holter monitor. RN notified front to schedule and sent in refills at this time.

## 2025-05-22 RX ORDER — SILDENAFIL 100 MG/1
TABLET, FILM COATED ORAL
Qty: 20 TABLET | Refills: 6 | OUTPATIENT
Start: 2025-05-22

## 2025-06-03 ENCOUNTER — APPOINTMENT (OUTPATIENT)
Dept: CARDIOLOGY | Facility: HOSPITAL | Age: 75
End: 2025-06-03
Payer: COMMERCIAL

## 2025-06-05 ENCOUNTER — TELEPHONE (OUTPATIENT)
Dept: CARDIOLOGY | Facility: HOSPITAL | Age: 75
End: 2025-06-05
Payer: COMMERCIAL

## 2025-06-05 NOTE — TELEPHONE ENCOUNTER
Patient called in to reschedule his stress test and follow up due to a pulled hamstring.    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 States that he is going to fax it over.    Routing to RN pool for awareness and support.     Thank you!  Ace MCKEON

## 2025-06-06 DIAGNOSIS — S76.311A RUPTURE OF RIGHT PROXIMAL HAMSTRING TENDON, INITIAL ENCOUNTER: Primary | ICD-10-CM

## 2025-06-09 ENCOUNTER — HOSPITAL ENCOUNTER (OUTPATIENT)
Dept: RADIOLOGY | Facility: HOSPITAL | Age: 75
Discharge: HOME | End: 2025-06-09
Payer: COMMERCIAL

## 2025-06-09 DIAGNOSIS — S76.311A RUPTURE OF RIGHT PROXIMAL HAMSTRING TENDON, INITIAL ENCOUNTER: ICD-10-CM

## 2025-06-09 PROCEDURE — 73721 MRI JNT OF LWR EXTRE W/O DYE: CPT | Mod: RT

## 2025-06-10 ENCOUNTER — APPOINTMENT (OUTPATIENT)
Dept: CARDIOLOGY | Facility: HOSPITAL | Age: 75
End: 2025-06-10
Payer: COMMERCIAL

## 2025-06-13 ENCOUNTER — APPOINTMENT (OUTPATIENT)
Dept: CARDIOLOGY | Facility: HOSPITAL | Age: 75
End: 2025-06-13
Payer: COMMERCIAL

## 2025-06-13 ENCOUNTER — DOCUMENTATION (OUTPATIENT)
Dept: CARDIOLOGY | Facility: HOSPITAL | Age: 75
End: 2025-06-13
Payer: COMMERCIAL

## 2025-06-13 NOTE — PROGRESS NOTES
Request from Dr. Díaz for Arthroscopically     Diagnosis/what are we seeing for:  a-fib/flutter, CMP, and CAD.     Last ischemic work up left heart cath  Date: 2025  Last echocardiogram 04/15/2025 ECHO Stress  Anticoagulation: None  Antiplatelet: ASA  Has patient had a recent cardioversion or ablation?  no     Last seen by  04/10/2025    Next appointment:  2025                         [address] [place] [address]                   Phone# [phone]              Fax# [phone]      Date: 25    RE: Alfred Díaz            : 1950       Surgical/Procedural Clearance for:  Knee surgery  Patient is at: LOW cardiovascular risk for this LOW risk procedure.           N/A hold Antiplatelet      N/A hold Anticoagulant                    Is further cardiac workup is needed prior to the procedure?  No     Patient should continue Beta Blocker in the perioperative period.  Yes     Patient should resume antiplatelet/anticoagulation as soon as cleared by surgeon/procedure physician.  N/A       Thank You,    25 at 10:32 AM - Alber Bolaños MD

## 2025-06-19 ENCOUNTER — TELEPHONE (OUTPATIENT)
Dept: CARDIOLOGY | Facility: HOSPITAL | Age: 75
End: 2025-06-19
Payer: COMMERCIAL

## 2025-06-19 NOTE — TELEPHONE ENCOUNTER
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 RN also checked with dr. Bolaños at this time and is still recommending a Holter monitor even a 7 day monitor. Pt verbalized understanding at this time.

## 2025-06-24 ENCOUNTER — APPOINTMENT (OUTPATIENT)
Dept: CARDIOLOGY | Facility: HOSPITAL | Age: 75
End: 2025-06-24
Payer: COMMERCIAL

## 2025-06-26 ENCOUNTER — APPOINTMENT (OUTPATIENT)
Dept: CARDIOLOGY | Facility: HOSPITAL | Age: 75
End: 2025-06-26
Payer: COMMERCIAL

## 2025-07-10 DIAGNOSIS — I25.10 CORONARY ARTERY DISEASE INVOLVING NATIVE CORONARY ARTERY OF NATIVE HEART, UNSPECIFIED WHETHER ANGINA PRESENT: ICD-10-CM

## 2025-07-10 DIAGNOSIS — I48.0 PAROXYSMAL ATRIAL FIBRILLATION (MULTI): ICD-10-CM

## 2025-08-02 DIAGNOSIS — I25.10 CORONARY ARTERY DISEASE INVOLVING NATIVE CORONARY ARTERY OF NATIVE HEART, UNSPECIFIED WHETHER ANGINA PRESENT: ICD-10-CM

## 2025-08-04 RX ORDER — ATORVASTATIN CALCIUM 40 MG/1
40 TABLET, FILM COATED ORAL DAILY
Qty: 90 TABLET | Refills: 0 | Status: SHIPPED | OUTPATIENT
Start: 2025-08-04

## 2025-09-02 DIAGNOSIS — I25.10 CORONARY ARTERY DISEASE INVOLVING NATIVE CORONARY ARTERY OF NATIVE HEART, UNSPECIFIED WHETHER ANGINA PRESENT: ICD-10-CM

## 2025-09-02 RX ORDER — METOPROLOL SUCCINATE 100 MG/1
100 TABLET, EXTENDED RELEASE ORAL DAILY
Qty: 90 TABLET | Refills: 3 | Status: SHIPPED | OUTPATIENT
Start: 2025-09-02

## (undated) DEVICE — TR BAND, RADIAL COMPRESSION, LONG, 29CM

## (undated) DEVICE — 6FR DXTERITY JL 4.0 DIAGNOSTIC CATHETER, .038 125 CM RADIAL

## (undated) DEVICE — GUIDEWIRE, INQUIRE, J TIP, .035 X 210CM, FIXED CORE, DIAGNOSTIC

## (undated) DEVICE — CATHETER, OPTITORQUE, 6FR, JACKY, BL3.5/2H/100CM

## (undated) DEVICE — SHEATH, GLIDESHEATH, SLENDER, 6FR 10CM